# Patient Record
Sex: MALE | Race: WHITE | Employment: OTHER | URBAN - METROPOLITAN AREA
[De-identification: names, ages, dates, MRNs, and addresses within clinical notes are randomized per-mention and may not be internally consistent; named-entity substitution may affect disease eponyms.]

---

## 2024-04-23 NOTE — PROGRESS NOTES
Name_______________________________________Printed:____________________  Date and time of surgery__4/25/2024_______0730_______________Arrival Time:____0600____________   1. The instructions given regarding when and if a patient needs to stop oral intake prior to surgery varies.Follow the specific instructions you were given                  _x__Nothing to eat or to drink after Midnight the night before.                   ____Carbo loading or instructions will be given to select patients-if you have been given those instructions -please do the following                           The evening before your surgery after dinner before midnight drink 40 ounces of gatorade.If you are diabetic use sugar free.  The morning of surgery drink 40 ounces of water.This needs to be finished 3 hours prior to your surgery start time.    2. Take the following pills with a small sip of water on the morning of surgery___________________________________________________                  Do not take blood pressure medications ending in pril or sartan the reji prior to surgery or the morning of surgery. Dr York's patient are not to take any medications the AM of surgery.         3. Aspirin, Ibuprofen, Advil, Naproxen, Vitamin E and other Anti-inflammatory products and supplements should be stopped for 5 -7days before surgery or as directed by your physician.   4. Check with your Doctor regarding stopping Plavix, Coumadin,Eliquis, Lovenox,Effient,Pradaxa,Xarelto, Fragmin or other blood thinners and follow their instructions.   5. Do not smoke, and do not drink any alcoholic beverages 24 hours prior to surgery.  This includes NA Beer.Refrain from the usage of any recreational drugs.   6. You may brush your teeth and gargle the morning of surgery.  DO NOT SWALLOW WATER   7. You MUST make arrangements for a responsible adult to stay on site while you are here and take you home after your surgery. You will not be allowed to leave alone or drive

## 2024-04-24 ENCOUNTER — ANESTHESIA EVENT (OUTPATIENT)
Dept: OPERATING ROOM | Age: 74
End: 2024-04-24
Payer: MEDICARE

## 2024-04-25 ENCOUNTER — APPOINTMENT (OUTPATIENT)
Dept: GENERAL RADIOLOGY | Age: 74
End: 2024-04-25
Attending: UROLOGY
Payer: MEDICARE

## 2024-04-25 ENCOUNTER — HOSPITAL ENCOUNTER (OUTPATIENT)
Age: 74
Setting detail: OUTPATIENT SURGERY
Discharge: HOME | End: 2024-04-25
Attending: UROLOGY | Admitting: UROLOGY
Payer: MEDICARE

## 2024-04-25 ENCOUNTER — ANESTHESIA (OUTPATIENT)
Dept: OPERATING ROOM | Age: 74
End: 2024-04-25
Payer: MEDICARE

## 2024-04-25 VITALS
OXYGEN SATURATION: 96 % | RESPIRATION RATE: 14 BRPM | HEART RATE: 69 BPM | BODY MASS INDEX: 22.73 KG/M2 | WEIGHT: 163 LBS | DIASTOLIC BLOOD PRESSURE: 65 MMHG | SYSTOLIC BLOOD PRESSURE: 150 MMHG | TEMPERATURE: 97 F

## 2024-04-25 DIAGNOSIS — N21.0 CALCULUS IN BLADDER: Primary | ICD-10-CM

## 2024-04-25 PROCEDURE — 6370000000 HC RX 637 (ALT 250 FOR IP): Performed by: ANESTHESIOLOGY

## 2024-04-25 PROCEDURE — 7100000010 HC PHASE II RECOVERY - FIRST 15 MIN: Performed by: UROLOGY

## 2024-04-25 PROCEDURE — 3600000004 HC SURGERY LEVEL 4 BASE: Performed by: UROLOGY

## 2024-04-25 PROCEDURE — 6360000002 HC RX W HCPCS: Performed by: ANESTHESIOLOGY

## 2024-04-25 PROCEDURE — 7100000000 HC PACU RECOVERY - FIRST 15 MIN: Performed by: UROLOGY

## 2024-04-25 PROCEDURE — 3700000000 HC ANESTHESIA ATTENDED CARE: Performed by: UROLOGY

## 2024-04-25 PROCEDURE — 6360000002 HC RX W HCPCS: Performed by: UROLOGY

## 2024-04-25 PROCEDURE — 2720000010 HC SURG SUPPLY STERILE: Performed by: UROLOGY

## 2024-04-25 PROCEDURE — 3600000014 HC SURGERY LEVEL 4 ADDTL 15MIN: Performed by: UROLOGY

## 2024-04-25 PROCEDURE — 3700000001 HC ADD 15 MINUTES (ANESTHESIA): Performed by: UROLOGY

## 2024-04-25 PROCEDURE — 6360000002 HC RX W HCPCS

## 2024-04-25 PROCEDURE — 7100000011 HC PHASE II RECOVERY - ADDTL 15 MIN: Performed by: UROLOGY

## 2024-04-25 PROCEDURE — 2580000003 HC RX 258: Performed by: UROLOGY

## 2024-04-25 PROCEDURE — C1769 GUIDE WIRE: HCPCS | Performed by: UROLOGY

## 2024-04-25 PROCEDURE — 82365 CALCULUS SPECTROSCOPY: CPT

## 2024-04-25 PROCEDURE — 2709999900 HC NON-CHARGEABLE SUPPLY: Performed by: UROLOGY

## 2024-04-25 PROCEDURE — 2500000003 HC RX 250 WO HCPCS

## 2024-04-25 PROCEDURE — 7100000001 HC PACU RECOVERY - ADDTL 15 MIN: Performed by: UROLOGY

## 2024-04-25 RX ORDER — CIPROFLOXACIN 2 MG/ML
400 INJECTION, SOLUTION INTRAVENOUS
Status: DISCONTINUED | OUTPATIENT
Start: 2024-04-25 | End: 2024-04-25 | Stop reason: ALTCHOICE

## 2024-04-25 RX ORDER — HYDROCODONE BITARTRATE AND ACETAMINOPHEN 5; 325 MG/1; MG/1
1 TABLET ORAL EVERY 6 HOURS PRN
Qty: 20 TABLET | Refills: 0 | Status: SHIPPED | OUTPATIENT
Start: 2024-04-25 | End: 2024-04-30

## 2024-04-25 RX ORDER — DEXAMETHASONE SODIUM PHOSPHATE 4 MG/ML
INJECTION, SOLUTION INTRA-ARTICULAR; INTRALESIONAL; INTRAMUSCULAR; INTRAVENOUS; SOFT TISSUE PRN
Status: DISCONTINUED | OUTPATIENT
Start: 2024-04-25 | End: 2024-04-25 | Stop reason: SDUPTHER

## 2024-04-25 RX ORDER — SODIUM CHLORIDE 0.9 % (FLUSH) 0.9 %
5-40 SYRINGE (ML) INJECTION EVERY 12 HOURS SCHEDULED
Status: DISCONTINUED | OUTPATIENT
Start: 2024-04-25 | End: 2024-04-25 | Stop reason: HOSPADM

## 2024-04-25 RX ORDER — NALOXONE HYDROCHLORIDE 0.4 MG/ML
INJECTION, SOLUTION INTRAMUSCULAR; INTRAVENOUS; SUBCUTANEOUS PRN
Status: DISCONTINUED | OUTPATIENT
Start: 2024-04-25 | End: 2024-04-25 | Stop reason: HOSPADM

## 2024-04-25 RX ORDER — HYDROMORPHONE HYDROCHLORIDE 2 MG/ML
0.5 INJECTION, SOLUTION INTRAMUSCULAR; INTRAVENOUS; SUBCUTANEOUS EVERY 5 MIN PRN
Status: DISCONTINUED | OUTPATIENT
Start: 2024-04-25 | End: 2024-04-25 | Stop reason: HOSPADM

## 2024-04-25 RX ORDER — SODIUM CHLORIDE 0.9 % (FLUSH) 0.9 %
5-40 SYRINGE (ML) INJECTION PRN
Status: DISCONTINUED | OUTPATIENT
Start: 2024-04-25 | End: 2024-04-25 | Stop reason: HOSPADM

## 2024-04-25 RX ORDER — ROCURONIUM BROMIDE 10 MG/ML
INJECTION, SOLUTION INTRAVENOUS PRN
Status: DISCONTINUED | OUTPATIENT
Start: 2024-04-25 | End: 2024-04-25 | Stop reason: SDUPTHER

## 2024-04-25 RX ORDER — APREPITANT 40 MG/1
40 CAPSULE ORAL ONCE
Status: COMPLETED | OUTPATIENT
Start: 2024-04-25 | End: 2024-04-25

## 2024-04-25 RX ORDER — FENTANYL CITRATE 50 UG/ML
25 INJECTION, SOLUTION INTRAMUSCULAR; INTRAVENOUS EVERY 5 MIN PRN
Status: DISCONTINUED | OUTPATIENT
Start: 2024-04-25 | End: 2024-04-25 | Stop reason: HOSPADM

## 2024-04-25 RX ORDER — HYDRALAZINE HYDROCHLORIDE 20 MG/ML
10 INJECTION INTRAMUSCULAR; INTRAVENOUS
Status: DISCONTINUED | OUTPATIENT
Start: 2024-04-25 | End: 2024-04-25 | Stop reason: HOSPADM

## 2024-04-25 RX ORDER — ONDANSETRON 2 MG/ML
4 INJECTION INTRAMUSCULAR; INTRAVENOUS
Status: DISCONTINUED | OUTPATIENT
Start: 2024-04-25 | End: 2024-04-25 | Stop reason: HOSPADM

## 2024-04-25 RX ORDER — LIDOCAINE HYDROCHLORIDE 10 MG/ML
0.5 INJECTION, SOLUTION EPIDURAL; INFILTRATION; INTRACAUDAL; PERINEURAL ONCE
Status: DISCONTINUED | OUTPATIENT
Start: 2024-04-25 | End: 2024-04-25 | Stop reason: HOSPADM

## 2024-04-25 RX ORDER — DEXMEDETOMIDINE HYDROCHLORIDE 100 UG/ML
INJECTION, SOLUTION INTRAVENOUS PRN
Status: DISCONTINUED | OUTPATIENT
Start: 2024-04-25 | End: 2024-04-25 | Stop reason: SDUPTHER

## 2024-04-25 RX ORDER — LABETALOL HYDROCHLORIDE 5 MG/ML
10 INJECTION, SOLUTION INTRAVENOUS
Status: DISCONTINUED | OUTPATIENT
Start: 2024-04-25 | End: 2024-04-25 | Stop reason: HOSPADM

## 2024-04-25 RX ORDER — OXYCODONE HYDROCHLORIDE 5 MG/1
5 TABLET ORAL
Status: DISCONTINUED | OUTPATIENT
Start: 2024-04-25 | End: 2024-04-25 | Stop reason: HOSPADM

## 2024-04-25 RX ORDER — MIDAZOLAM HYDROCHLORIDE 2 MG/2ML
2 INJECTION, SOLUTION INTRAMUSCULAR; INTRAVENOUS
Status: DISCONTINUED | OUTPATIENT
Start: 2024-04-25 | End: 2024-04-25 | Stop reason: HOSPADM

## 2024-04-25 RX ORDER — FENTANYL CITRATE 50 UG/ML
INJECTION, SOLUTION INTRAMUSCULAR; INTRAVENOUS PRN
Status: DISCONTINUED | OUTPATIENT
Start: 2024-04-25 | End: 2024-04-25 | Stop reason: SDUPTHER

## 2024-04-25 RX ORDER — SODIUM CHLORIDE 9 MG/ML
INJECTION, SOLUTION INTRAVENOUS PRN
Status: DISCONTINUED | OUTPATIENT
Start: 2024-04-25 | End: 2024-04-25 | Stop reason: HOSPADM

## 2024-04-25 RX ORDER — SODIUM CHLORIDE, SODIUM LACTATE, POTASSIUM CHLORIDE, CALCIUM CHLORIDE 600; 310; 30; 20 MG/100ML; MG/100ML; MG/100ML; MG/100ML
INJECTION, SOLUTION INTRAVENOUS CONTINUOUS
Status: DISCONTINUED | OUTPATIENT
Start: 2024-04-25 | End: 2024-04-25 | Stop reason: HOSPADM

## 2024-04-25 RX ORDER — ACETAMINOPHEN 325 MG/1
650 TABLET ORAL ONCE
Status: COMPLETED | OUTPATIENT
Start: 2024-04-25 | End: 2024-04-25

## 2024-04-25 RX ORDER — DIPHENHYDRAMINE HYDROCHLORIDE 50 MG/ML
12.5 INJECTION INTRAMUSCULAR; INTRAVENOUS
Status: DISCONTINUED | OUTPATIENT
Start: 2024-04-25 | End: 2024-04-25 | Stop reason: HOSPADM

## 2024-04-25 RX ORDER — PROCHLORPERAZINE EDISYLATE 5 MG/ML
5 INJECTION INTRAMUSCULAR; INTRAVENOUS
Status: DISCONTINUED | OUTPATIENT
Start: 2024-04-25 | End: 2024-04-25 | Stop reason: HOSPADM

## 2024-04-25 RX ORDER — LIDOCAINE HYDROCHLORIDE 20 MG/ML
INJECTION, SOLUTION EPIDURAL; INFILTRATION; INTRACAUDAL; PERINEURAL PRN
Status: DISCONTINUED | OUTPATIENT
Start: 2024-04-25 | End: 2024-04-25 | Stop reason: SDUPTHER

## 2024-04-25 RX ORDER — ONDANSETRON 2 MG/ML
INJECTION INTRAMUSCULAR; INTRAVENOUS PRN
Status: DISCONTINUED | OUTPATIENT
Start: 2024-04-25 | End: 2024-04-25 | Stop reason: SDUPTHER

## 2024-04-25 RX ORDER — PHENAZOPYRIDINE HYDROCHLORIDE 100 MG/1
200 TABLET, FILM COATED ORAL 3 TIMES DAILY PRN
Qty: 9 TABLET | Refills: 0 | Status: SHIPPED | OUTPATIENT
Start: 2024-04-25 | End: 2024-04-28

## 2024-04-25 RX ORDER — LEVOFLOXACIN 5 MG/ML
500 INJECTION, SOLUTION INTRAVENOUS
Status: COMPLETED | OUTPATIENT
Start: 2024-04-25 | End: 2024-04-25

## 2024-04-25 RX ORDER — MEPERIDINE HYDROCHLORIDE 25 MG/ML
12.5 INJECTION INTRAMUSCULAR; INTRAVENOUS; SUBCUTANEOUS
Status: DISCONTINUED | OUTPATIENT
Start: 2024-04-25 | End: 2024-04-25 | Stop reason: HOSPADM

## 2024-04-25 RX ORDER — PROPOFOL 10 MG/ML
INJECTION, EMULSION INTRAVENOUS PRN
Status: DISCONTINUED | OUTPATIENT
Start: 2024-04-25 | End: 2024-04-25 | Stop reason: SDUPTHER

## 2024-04-25 RX ORDER — AMOXICILLIN 250 MG
1 CAPSULE ORAL 2 TIMES DAILY
Qty: 30 TABLET | Refills: 1 | Status: SHIPPED | OUTPATIENT
Start: 2024-04-25 | End: 2024-05-25

## 2024-04-25 RX ADMIN — ACETAMINOPHEN 650 MG: 325 TABLET ORAL at 07:07

## 2024-04-25 RX ADMIN — FENTANYL CITRATE 50 MCG: 50 INJECTION, SOLUTION INTRAMUSCULAR; INTRAVENOUS at 07:36

## 2024-04-25 RX ADMIN — SODIUM CHLORIDE, POTASSIUM CHLORIDE, SODIUM LACTATE AND CALCIUM CHLORIDE: 600; 310; 30; 20 INJECTION, SOLUTION INTRAVENOUS at 06:55

## 2024-04-25 RX ADMIN — FENTANYL CITRATE 25 MCG: 50 INJECTION, SOLUTION INTRAMUSCULAR; INTRAVENOUS at 08:01

## 2024-04-25 RX ADMIN — APREPITANT 40 MG: 40 CAPSULE ORAL at 07:07

## 2024-04-25 RX ADMIN — ROCURONIUM BROMIDE 50 MG: 10 INJECTION, SOLUTION INTRAVENOUS at 07:37

## 2024-04-25 RX ADMIN — LEVOFLOXACIN 500 MG: 5 INJECTION, SOLUTION INTRAVENOUS at 07:21

## 2024-04-25 RX ADMIN — LIDOCAINE HYDROCHLORIDE 100 MG: 20 INJECTION, SOLUTION EPIDURAL; INFILTRATION; INTRACAUDAL; PERINEURAL at 07:36

## 2024-04-25 RX ADMIN — DEXMEDETOMIDINE HYDROCHLORIDE 4 MCG: 100 INJECTION, SOLUTION INTRAVENOUS at 08:05

## 2024-04-25 RX ADMIN — ONDANSETRON 4 MG: 2 INJECTION INTRAMUSCULAR; INTRAVENOUS at 07:57

## 2024-04-25 RX ADMIN — FENTANYL CITRATE 25 MCG: 50 INJECTION, SOLUTION INTRAMUSCULAR; INTRAVENOUS at 08:03

## 2024-04-25 RX ADMIN — SUGAMMADEX 200 MG: 100 INJECTION, SOLUTION INTRAVENOUS at 07:55

## 2024-04-25 RX ADMIN — DEXAMETHASONE SODIUM PHOSPHATE 8 MG: 4 INJECTION, SOLUTION INTRAMUSCULAR; INTRAVENOUS at 07:47

## 2024-04-25 RX ADMIN — PROPOFOL 150 MG: 10 INJECTION, EMULSION INTRAVENOUS at 07:36

## 2024-04-25 ASSESSMENT — PAIN - FUNCTIONAL ASSESSMENT
PAIN_FUNCTIONAL_ASSESSMENT: NONE - DENIES PAIN
PAIN_FUNCTIONAL_ASSESSMENT: NONE - DENIES PAIN

## 2024-04-25 ASSESSMENT — PAIN SCALES - GENERAL: PAINLEVEL_OUTOF10: 0

## 2024-04-25 ASSESSMENT — LIFESTYLE VARIABLES: SMOKING_STATUS: 0

## 2024-04-25 NOTE — ANESTHESIA PRE PROCEDURE
hypertension, past MI, CAD, dysrhythmias,  CHF, orthopnea,  RAPP, murmur and weak pulses      Rhythm: regular  Rate: normal                    Neuro/Psych:      (-) seizures, TIA and CVA           GI/Hepatic/Renal:   (+) bowel prep     (-) GERD, PUD, liver disease, no renal disease (history of renal calculi) and no morbid obesity       Endo/Other:    (+) malignancy/cancer (prostate, radiation pending).    (-) diabetes mellitus, hypothyroidism, hyperthyroidism, blood dyscrasia, no electrolyte abnormalities               Abdominal:         (-) obese       Vascular:          Other Findings:         Anesthesia Plan      general     ASA 2             Anesthetic plan and risks discussed with patient.      Plan discussed with CRNA.          This pre-anesthesia assessment may be used as a history and physical.    DOS STAFF ADDENDUM:    Pt seen and examined, chart reviewed (including anesthesia, drug and allergy history).  No interval changes to history and physical examination.  Anesthetic plan, risks, benefits, alternatives, and personnel involved discussed with patient.  Patient verbalized an understanding and agrees to proceed.      SANTO GARZON MD  April 25, 2024  7:01 AM

## 2024-04-25 NOTE — DISCHARGE INSTRUCTIONS
Follow up with Dr. Barnes with any questions, concerns, results, and an appointment after your procedure       It is perfectly normal and expected to have pink urine and to feel the need to urinate often while having a catheter in place.  Keep the bag below the level of the bladder and do not let it get so full that urine backs up into the tubing.  The urine will drain from your bladder constantly as long as the bag is below the level of the bladder.  You cannot control the flow of urine.  You may shower with a kumar catheter.  Just towel dry the bag and tubing.  Use soap and water to clean the site of insertion into your body.      UROLOGY DISCHARGE INSTRUCTIONS    Follow your surgeons instructions.  Your urine may look pink or red. You may also feel like you have to urinate often.  Call your surgeon if your temperature is higher than 101 degrees, your urine is grossly bloody, or has large clots, or is not draining into the bag (the tubing is clogged).  Drink plenty of fluids unless your physician advises against it.  You will be discharged home with a catheter in your bladder; follow instructions on care.   Take medications as directed.Take pain medication with food. Do not drive or operate machinery while taking narcotics.  Call your surgeon for any problems or questions        SEDATION DISCHARGE INSTRUCTIONS    4/25/2024    Wear your seatbelt home.  You are under the influence of drugs. Do not drink alcohol, drive, operate machinery, or make any important decisions or sign any legal documents for 24 hours  A responsible adult needs to be with you for 24 hours.  You may experience lightheadedness, dizziness, nausea, heightened emotions and/or sleepiness following surgery.  Rest at home today- increase activity as tolerated.  Progress slowly to a regular diet and drink plenty of fluids unless your physician has instructed you otherwise.  If nausea becomes a problem, call your physician.  Coughing, sore throat, and

## 2024-04-25 NOTE — PROGRESS NOTES
Kumar securement device placed by nurse at this time. Discharge instructions reviewed with pt and friend. Pt stated he has had kumar 3x previously. Nurse reviewed all home kumar care. All questions answered. Friend picked scripts up at OP nurse provided education on new medications all questions answered. Pt up with friend getting dressed. Pt requested to use only leg bag while home.

## 2024-04-25 NOTE — PROGRESS NOTES
Pt on unit from pacu report received at bedside. VSS pt denies any pain stated he feels \"mild burning\" pt aware kumar in place leg bag attached at this time. Call light in reach family at bedside. Pt tolerating fluids well.

## 2024-04-25 NOTE — OP NOTE
Urology Operative Note  [facility]  [facility]    Patient: Rob Claire  YOB: 1950   MRN: [id number]   Date of Procedure: 4/25/2024    Surgeon: Anson Barnes MD, BRENDAN    Preoperative Diagnosis: Calculus in bladder [N21.0]    Postoperative Diagnosis: same    Procedure: Litholapaxy: crushing or fragmentation of calculus by any means in bladder and removal of fragments; simple or small (less than 2.5 cm) (CPT 28798)      Anesthesia: General / LMA    Findings:   Bladder stone(s) (Size = 0.5 cm total volume; Number = 2); prior TURP defect, wide open, with 2 separate stones floor of bladder adjacent to bladder neck, no significant signs of radiation cystitis nor other abnormalities seen on cystourethroscopy, no urethral stricture disease, no tumors or other stone seen beyond the 2 that were treated in their entirety with holmium laser lithotripsy    Infection Present At Time Of Surgery (PATOS): No infection present    Estimated Blood Loss (mL): minimal    Complications: none apparent    Specimens: stone for analysis    Implants: none    Drains: Bauer catheter (Size: 20 Fr; Type: Coudé; with 10 mL sterile water in balloon)                 Indications: This patient presents for the above named surgery for bladder tumor(s). History and physical examination and other relevant patient data were reviewed and are detailed in the preoperative history/consult/progress note. Informed consent was obtained and the risks, benefits, and details of the procedure were explained to the patient who elected to proceed.    Description of Procedure:  The patient was brought to the operating room and placed in the supine position on the operating room table. SCDs were placed on the lower extremities. Following induction of anesthesia the patient was positioned in a lithotomy position, all pressure points were padded, and the genitals were prepped and draped in the usual sterile fashion. A routine

## 2024-04-25 NOTE — ANESTHESIA POSTPROCEDURE EVALUATION
Department of Anesthesiology  Postprocedure Note    Patient: Rob Claire  MRN: [id number]  YOB: 1950  Date of evaluation: 4/25/2024    Procedure Summary       Date: 04/25/24 Room / Location: [address]    Anesthesia Start: 0728 Anesthesia Stop: 0814    Procedure: CYSTOSCOPY LITHOLAPAXY OF  BLADDER STONE WITH HOLMIUM LASER- [facility] # [id number] Diagnosis:       Calculus, bladder      (Calculus, bladder [N21.0])    Surgeons: Anson Barnes MD Responsible Provider: Johnny Newton MD    Anesthesia Type: general ASA Status: 2            Anesthesia Type: No value filed.    Miroslava Phase I: Miroslava Score: 10    Miroslava Phase II: Miroslava Score: 10    Anesthesia Post Evaluation    Patient location during evaluation: PACU  Patient participation: complete - patient participated  Level of consciousness: awake and alert  Airway patency: patent  Nausea & Vomiting: no nausea and no vomiting  Cardiovascular status: blood pressure returned to baseline  Respiratory status: acceptable  Hydration status: euvolemic  Multimodal analgesia pain management approach  Pain management: adequate    No notable events documented.

## 2024-04-25 NOTE — PROGRESS NOTES
Pt resting quietly in bed, denies pain. VSS, O2 sats 96% on room air. Bauer remains in place draining clear yellow urine. Pt seen by anesthesia, phase 1 criteria met.

## 2024-04-25 NOTE — H&P
Urology Preoperative History & Physical  [facility]     Patient: Rob Claire MRN: [id number]  Room/Bed: OR/NONE   YOB: 1950  Age/Sex: 73 y.o.male  Admission Date: 4/25/2024     Date of Service:  4/25/2024    ASSESSMENT/PLAN     Bladder stones here for cystolithalopaxy  RBA DWP    Plan:  To OR for above procedure    All patient questions were answered. He understands the plan as listed above.    HISTORY     Chief Complaint: As above    History of Present Illness: Rob Claire is a 73 y.o. male with above listed problems.  No changes in history/physical since last evaluation.  No change in symptoms.      Past Medical History:  He has a past medical history of Arthritis, Cancer (HCC), and Kidney stone.     Hospital Problem List:  Active Problems:    * No active hospital problems. *  Resolved Problems:    * No resolved hospital problems. *      Past Surgical History:  He has a past surgical history that includes Cystoscopy (2010); TURP (09/27/2005); Vasectomy (Bilateral); TURP; Colonoscopy (12/19/2017); Finger trigger release (Left, 3/29/2019); and Colonoscopy (N/A, 12/20/2022).     Social History:  He reports that he has never smoked. He has never used smokeless tobacco. He reports current alcohol use. He reports that he does not use drugs.     Family History:  family history includes Cancer (age of onset: 63) in his father; Colon Cancer in his father; Heart Disease in his mother; High Cholesterol in his brother; Thyroid Disease in his daughter.    Allergies:  No Known Allergies    Medications:  Scheduled Meds:   lidocaine PF  0.5 mL IntraDERmal Once    levofloxacin  500 mg IntraVENous On Call to OR     Continuous Infusions:   lactated ringers IV soln 50 mL/hr at 04/25/24 0655     PRN Meds:    Review of Systems:  Pertinent positives/negatives reviewed in HPI.  All other systems reviewed and negative, unless noted below.    Constitutional: Negative  Genitourinary: see HPI  HEENT:

## 2024-04-25 NOTE — PROGRESS NOTES
Pt arrived from OR to PACU, awakens to voice, denies pain. VSS, O2 sats 100% on 6 L simple mask. Bauer in place with leg bag, urine clear yellow. Will monitor.

## 2024-04-30 LAB
APPEARANCE STONE: NORMAL
COMPN STONE: NORMAL
SPECIMEN WT: 4 MG

## 2024-05-29 RX ORDER — PRAVASTATIN SODIUM 40 MG
TABLET ORAL
Qty: 90 TABLET | Refills: 1 | Status: SHIPPED | OUTPATIENT
Start: 2024-05-29

## 2024-06-03 ASSESSMENT — PATIENT HEALTH QUESTIONNAIRE - PHQ9
SUM OF ALL RESPONSES TO PHQ9 QUESTIONS 1 & 2: 0
1. LITTLE INTEREST OR PLEASURE IN DOING THINGS: NOT AT ALL
1. LITTLE INTEREST OR PLEASURE IN DOING THINGS: NOT AT ALL
SUM OF ALL RESPONSES TO PHQ QUESTIONS 1-9: 0
SUM OF ALL RESPONSES TO PHQ QUESTIONS 1-9: 0
2. FEELING DOWN, DEPRESSED OR HOPELESS: NOT AT ALL
SUM OF ALL RESPONSES TO PHQ9 QUESTIONS 1 & 2: 0
SUM OF ALL RESPONSES TO PHQ QUESTIONS 1-9: 0
SUM OF ALL RESPONSES TO PHQ QUESTIONS 1-9: 0
2. FEELING DOWN, DEPRESSED OR HOPELESS: NOT AT ALL

## 2024-06-05 NOTE — PATIENT INSTRUCTIONS

## 2024-06-06 ENCOUNTER — OFFICE VISIT (OUTPATIENT)
Dept: FAMILY MEDICINE CLINIC | Age: 74
End: 2024-06-06

## 2024-06-06 VITALS
HEIGHT: 71 IN | BODY MASS INDEX: 23.1 KG/M2 | SYSTOLIC BLOOD PRESSURE: 128 MMHG | OXYGEN SATURATION: 98 % | DIASTOLIC BLOOD PRESSURE: 80 MMHG | HEART RATE: 62 BPM | WEIGHT: 165 LBS

## 2024-06-06 DIAGNOSIS — R49.0 HOARSENESS: Primary | ICD-10-CM

## 2024-06-06 DIAGNOSIS — Z86.2 HISTORY OF ANEMIA: ICD-10-CM

## 2024-06-06 DIAGNOSIS — E78.00 HYPERCHOLESTEREMIA: ICD-10-CM

## 2024-06-06 DIAGNOSIS — Z85.46 HISTORY OF PROSTATE CANCER: ICD-10-CM

## 2024-06-06 DIAGNOSIS — R63.4 WEIGHT LOSS: ICD-10-CM

## 2024-06-06 DIAGNOSIS — E05.90 HYPERTHYROIDISM: ICD-10-CM

## 2024-06-06 LAB
ALBUMIN SERPL-MCNC: 4 G/DL (ref 3.4–5)
ALBUMIN/GLOB SERPL: 1.7 {RATIO} (ref 1.1–2.2)
ALP SERPL-CCNC: 133 U/L (ref 40–129)
ALT SERPL-CCNC: 35 U/L (ref 10–40)
ANION GAP SERPL CALCULATED.3IONS-SCNC: 6 MMOL/L (ref 3–16)
AST SERPL-CCNC: 25 U/L (ref 15–37)
BASOPHILS # BLD: 0 K/UL (ref 0–0.2)
BASOPHILS NFR BLD: 0.7 %
BILIRUB SERPL-MCNC: 0.8 MG/DL (ref 0–1)
BUN SERPL-MCNC: 16 MG/DL (ref 7–20)
CALCIUM SERPL-MCNC: 9.7 MG/DL (ref 8.3–10.6)
CHLORIDE SERPL-SCNC: 108 MMOL/L (ref 99–110)
CO2 SERPL-SCNC: 26 MMOL/L (ref 21–32)
CREAT SERPL-MCNC: 0.7 MG/DL (ref 0.8–1.3)
DEPRECATED RDW RBC AUTO: 13.4 % (ref 12.4–15.4)
EOSINOPHIL # BLD: 0.1 K/UL (ref 0–0.6)
EOSINOPHIL NFR BLD: 2.1 %
GFR SERPLBLD CREATININE-BSD FMLA CKD-EPI: >90 ML/MIN/{1.73_M2}
GLUCOSE SERPL-MCNC: 103 MG/DL (ref 70–99)
HCT VFR BLD AUTO: 35.5 % (ref 40.5–52.5)
HGB BLD-MCNC: 12.3 G/DL (ref 13.5–17.5)
LYMPHOCYTES # BLD: 2.1 K/UL (ref 1–5.1)
LYMPHOCYTES NFR BLD: 41.1 %
MCH RBC QN AUTO: 32.2 PG (ref 26–34)
MCHC RBC AUTO-ENTMCNC: 34.7 G/DL (ref 31–36)
MCV RBC AUTO: 92.9 FL (ref 80–100)
MONOCYTES # BLD: 0.6 K/UL (ref 0–1.3)
MONOCYTES NFR BLD: 12.2 %
NEUTROPHILS # BLD: 2.3 K/UL (ref 1.7–7.7)
NEUTROPHILS NFR BLD: 43.9 %
PLATELET # BLD AUTO: 140 K/UL (ref 135–450)
PMV BLD AUTO: 9 FL (ref 5–10.5)
POTASSIUM SERPL-SCNC: 4.1 MMOL/L (ref 3.5–5.1)
PROT SERPL-MCNC: 6.4 G/DL (ref 6.4–8.2)
RBC # BLD AUTO: 3.82 M/UL (ref 4.2–5.9)
SODIUM SERPL-SCNC: 140 MMOL/L (ref 136–145)
T4 FREE SERPL-MCNC: 3.3 NG/DL (ref 0.9–1.8)
TSH SERPL DL<=0.005 MIU/L-ACNC: <0.01 UIU/ML (ref 0.27–4.2)
WBC # BLD AUTO: 5.2 K/UL (ref 4–11)

## 2024-06-06 SDOH — ECONOMIC STABILITY: FOOD INSECURITY: WITHIN THE PAST 12 MONTHS, THE FOOD YOU BOUGHT JUST DIDN'T LAST AND YOU DIDN'T HAVE MONEY TO GET MORE.: NEVER TRUE

## 2024-06-06 SDOH — ECONOMIC STABILITY: FOOD INSECURITY: WITHIN THE PAST 12 MONTHS, YOU WORRIED THAT YOUR FOOD WOULD RUN OUT BEFORE YOU GOT MONEY TO BUY MORE.: NEVER TRUE

## 2024-06-06 SDOH — ECONOMIC STABILITY: INCOME INSECURITY: IN THE LAST 12 MONTHS, WAS THERE A TIME WHEN YOU WERE NOT ABLE TO PAY THE MORTGAGE OR RENT ON TIME?: NO

## 2024-06-06 SDOH — ECONOMIC STABILITY: TRANSPORTATION INSECURITY
IN THE PAST 12 MONTHS, HAS THE LACK OF TRANSPORTATION KEPT YOU FROM MEDICAL APPOINTMENTS OR FROM GETTING MEDICATIONS?: NO

## 2024-06-06 SDOH — ECONOMIC STABILITY: HOUSING INSECURITY
IN THE LAST 12 MONTHS, WAS THERE A TIME WHEN YOU DID NOT HAVE A STEADY PLACE TO SLEEP OR SLEPT IN A SHELTER (INCLUDING NOW)?: NO

## 2024-06-06 SDOH — ECONOMIC STABILITY: INCOME INSECURITY: HOW HARD IS IT FOR YOU TO PAY FOR THE VERY BASICS LIKE FOOD, HOUSING, MEDICAL CARE, AND HEATING?: NOT HARD AT ALL

## 2024-06-06 SDOH — ECONOMIC STABILITY: TRANSPORTATION INSECURITY
IN THE PAST 12 MONTHS, HAS LACK OF TRANSPORTATION KEPT YOU FROM MEETINGS, WORK, OR FROM GETTING THINGS NEEDED FOR DAILY LIVING?: NO

## 2024-06-06 ASSESSMENT — SOCIAL DETERMINANTS OF HEALTH (SDOH): HOW HARD IS IT FOR YOU TO PAY FOR THE VERY BASICS LIKE FOOD, HOUSING, MEDICAL CARE, AND HEATING?: NOT HARD AT ALL

## 2024-06-06 NOTE — PROGRESS NOTES
[facility] Medicine  Clinic Note    Date: 6/6/2024                                               Subjective:     Chief Complaint   Patient presents with    Hoarse     HOARSENESS AND WEIGHT LOSS SINCE JAN.      HPI    Prostate cancer - xrt early 2023 - s/p cyberknife - seems to be good  Psa 0.05 on last check - sees urology dr. Barnes - had bladder stones - some urgency to urinate  Lipid/ cmp okay 10/23  STARTED 1/24 - THEN NOTICED WEIGHT LOSS 4/24 - UNDER 170 FOR FIRST TIME IN YEARS - NOW DOWN  BUT STABILIZED AROUND THERE OVER LAST FEW WEEKS.  Usually 175# is normal weight - down about 12#  Appetite okay -no change in diet - activity about the same  Walks 3x/ week- yard work - stays active.  Hoarseness for \"awhile\" but getting worse lately - so bad hard to talk - feels need to clear throat which helps short-term  No sore throat/ masses/ lumps in throat  Occ dry food like bread may get stuck.  Some pnd/ allergies, chronic for long term  No salena sxs  No cough/ sob  No other changes/ symptoms in body  On pravastatin / supplement  No palpitations/ anxiety sxs  Bm's okay - more frequent since cyberknife 2/d from 1/d and a little looser  No dental/ chewing problems  Energy okay  Ros o/w negative  Mood okay      Wt Readings from Last 3 Encounters:   06/06/24 74.8 kg (165 lb)   04/23/24 73.9 kg (163 lb)   10/05/23 80.3 kg (177 lb)     BP Readings from Last 3 Encounters:   06/06/24 128/80   04/25/24 (!) 150/65   10/05/23 126/78     Pulse Readings from Last 3 Encounters:   06/06/24 62   04/25/24 69   10/05/23 68            Patient Active Problem List    Diagnosis Date Noted    Trigger ring finger of left hand 02/27/2019    Other symptoms involving cardiovascular system 05/05/2011    Chalazion 05/05/2011    Benign neoplasm of skin of other and unspecified parts of face 05/05/2011    Sprain and strain of unspecified site of shoulder and upper arm 05/05/2011    Olecranon bursitis 05/05/2011    Swelling, mass, or lump

## 2024-06-07 RX ORDER — METHIMAZOLE 5 MG/1
5 TABLET ORAL 3 TIMES DAILY
Qty: 90 TABLET | Refills: 1 | Status: SHIPPED | OUTPATIENT
Start: 2024-06-07 | End: 2024-07-07

## 2024-06-10 ENCOUNTER — NURSE ONLY (OUTPATIENT)
Dept: FAMILY MEDICINE CLINIC | Age: 74
End: 2024-06-10
Payer: MEDICARE

## 2024-06-10 DIAGNOSIS — E05.90 HYPERTHYROIDISM: ICD-10-CM

## 2024-06-10 PROCEDURE — 36415 COLL VENOUS BLD VENIPUNCTURE: CPT | Performed by: FAMILY MEDICINE

## 2024-06-11 LAB
T3FREE SERPL-MCNC: 8 PG/ML (ref 2.3–4.2)
T4 FREE SERPL-MCNC: 3 NG/DL (ref 0.9–1.8)
THYROPEROXIDASE AB SERPL IA-ACNC: 7 IU/ML
TSH SERPL DL<=0.005 MIU/L-ACNC: <0.01 UIU/ML (ref 0.27–4.2)

## 2024-06-12 LAB — TSI SER-ACNC: 2.64 IU/L

## 2024-07-02 ENCOUNTER — OFFICE VISIT (OUTPATIENT)
Dept: ENT CLINIC | Age: 74
End: 2024-07-02
Payer: MEDICARE

## 2024-07-02 VITALS
OXYGEN SATURATION: 97 % | HEART RATE: 69 BPM | SYSTOLIC BLOOD PRESSURE: 162 MMHG | HEIGHT: 71 IN | BODY MASS INDEX: 23.38 KG/M2 | WEIGHT: 167 LBS | TEMPERATURE: 97.7 F | DIASTOLIC BLOOD PRESSURE: 65 MMHG

## 2024-07-02 DIAGNOSIS — R09.82 PND (POST-NASAL DRIP): ICD-10-CM

## 2024-07-02 DIAGNOSIS — J39.2 LESION OF PHARYNX: ICD-10-CM

## 2024-07-02 DIAGNOSIS — R49.0 DYSPHONIA: Primary | ICD-10-CM

## 2024-07-02 DIAGNOSIS — R13.10 DYSPHAGIA, UNSPECIFIED TYPE: ICD-10-CM

## 2024-07-02 DIAGNOSIS — K21.9 LARYNGOPHARYNGEAL REFLUX (LPR): ICD-10-CM

## 2024-07-02 PROCEDURE — 1123F ACP DISCUSS/DSCN MKR DOCD: CPT | Performed by: STUDENT IN AN ORGANIZED HEALTH CARE EDUCATION/TRAINING PROGRAM

## 2024-07-02 PROCEDURE — 31575 DIAGNOSTIC LARYNGOSCOPY: CPT | Performed by: STUDENT IN AN ORGANIZED HEALTH CARE EDUCATION/TRAINING PROGRAM

## 2024-07-02 PROCEDURE — 99204 OFFICE O/P NEW MOD 45 MIN: CPT | Performed by: STUDENT IN AN ORGANIZED HEALTH CARE EDUCATION/TRAINING PROGRAM

## 2024-07-02 RX ORDER — FLUTICASONE PROPIONATE 50 MCG
2 SPRAY, SUSPENSION (ML) NASAL DAILY
Qty: 48 G | Refills: 1 | Status: SHIPPED | OUTPATIENT
Start: 2024-07-02

## 2024-07-02 RX ORDER — LORATADINE 10 MG/1
10 TABLET ORAL DAILY
Qty: 30 TABLET | Refills: 3 | Status: SHIPPED | OUTPATIENT
Start: 2024-07-02

## 2024-07-02 RX ORDER — PANTOPRAZOLE SODIUM 40 MG/1
40 TABLET, DELAYED RELEASE ORAL
Qty: 90 TABLET | Refills: 1 | Status: SHIPPED | OUTPATIENT
Start: 2024-07-02

## 2024-07-02 NOTE — PROGRESS NOTES
hemorrhage, no nasal masses appreciated   Nasopharynx: Clear, no masses or inflammation  Oropharynx: No exophytic or ulcerative lesions, mucosa appears within normal limits  Base of Tongue: Lingual tonsils within normal limits, vallecula without effacement  Epiglottis: Upright, in normal anatomical position  True Vocal Cord: Anatomically normal, no masses or inflammation, normal abduction and adduction upon inspiration and phonation  False Vocal Cord: normal appearing without masses  Hypopharynx Mucosa: Nonobstructive protrusion of the left posterior pharyngeal wall superior to the level of the glottis with normal overlying mucosa.  + Moderate to severe postcricoid edema.  Arytenoids: Normal mucosa, no dislocation appreciated     * Patient tolerated the procedure well with no complications   * Patient was instructed not to eat for 30 minutes following procedure.   * Patient was instructed that they may notice minor bleeding.    Assessment and Plan     1. Dysphonia  2. Dysphagia, unspecified type  3. Lesion of pharynx  -Flexible fiberoptic laryngoscopy demonstrating protrusion of left posterior pharyngeal wall with overlying normal mucosa, possibly cervical osteophyte.  No ulcerative changes.  Will further evaluate with CT scan neck soft tissue with contrast.  Follow-up after CT neck.  - CT SOFT TISSUE NECK W CONTRAST; Future    4. Laryngopharyngeal reflux (LPR)  -Possibly related to underlying hoarseness and difficulty swallowing.  Start Protonix 40 mg daily.  -Discussed conservative management lifestyle modification strategies for reflux treatment including:      -Avoidance of late night eating and lying down soon after eating.  Consider lying down and sleeping in a reclined position as to reduce the gravity effects of acid reflux.        -Avoidance of trigger foods that could worsen reflux including alcohol, excessively salty foods, spicy foods, acidic foods, chocolate, peppermint, fatty foods, coffee.  -

## 2024-07-17 ENCOUNTER — HOSPITAL ENCOUNTER (OUTPATIENT)
Dept: CT IMAGING | Age: 74
Discharge: HOME | End: 2024-07-17
Attending: STUDENT IN AN ORGANIZED HEALTH CARE EDUCATION/TRAINING PROGRAM
Payer: MEDICARE

## 2024-07-17 DIAGNOSIS — R13.10 DYSPHAGIA, UNSPECIFIED TYPE: ICD-10-CM

## 2024-07-17 DIAGNOSIS — J39.2 LESION OF PHARYNX: ICD-10-CM

## 2024-07-17 PROCEDURE — 6360000004 HC RX CONTRAST MEDICATION: Performed by: STUDENT IN AN ORGANIZED HEALTH CARE EDUCATION/TRAINING PROGRAM

## 2024-07-17 PROCEDURE — 70491 CT SOFT TISSUE NECK W/DYE: CPT

## 2024-07-17 RX ADMIN — IOPAMIDOL 75 ML: 755 INJECTION, SOLUTION INTRAVENOUS at 07:14

## 2024-08-01 ENCOUNTER — OFFICE VISIT (OUTPATIENT)
Dept: ENT CLINIC | Age: 74
End: 2024-08-01
Payer: MEDICARE

## 2024-08-01 VITALS
HEIGHT: 71 IN | SYSTOLIC BLOOD PRESSURE: 161 MMHG | DIASTOLIC BLOOD PRESSURE: 70 MMHG | WEIGHT: 173 LBS | OXYGEN SATURATION: 97 % | TEMPERATURE: 97.7 F | HEART RATE: 61 BPM | BODY MASS INDEX: 24.22 KG/M2

## 2024-08-01 DIAGNOSIS — R13.10 DYSPHAGIA, UNSPECIFIED TYPE: ICD-10-CM

## 2024-08-01 DIAGNOSIS — R49.0 DYSPHONIA: Primary | ICD-10-CM

## 2024-08-01 DIAGNOSIS — K21.9 LARYNGOPHARYNGEAL REFLUX (LPR): ICD-10-CM

## 2024-08-01 PROCEDURE — 99214 OFFICE O/P EST MOD 30 MIN: CPT | Performed by: STUDENT IN AN ORGANIZED HEALTH CARE EDUCATION/TRAINING PROGRAM

## 2024-08-01 PROCEDURE — 1123F ACP DISCUSS/DSCN MKR DOCD: CPT | Performed by: STUDENT IN AN ORGANIZED HEALTH CARE EDUCATION/TRAINING PROGRAM

## 2024-08-01 NOTE — PROGRESS NOTES
[facility]  DIVISION OF OTOLARYNGOLOGY- HEAD & NECK SURGERY  CLINIC FOLLOW-UP VISIT      Patient Name: Rob Claire  Medical Record Number:  [id number]  Primary Care Physician:  Bebo Peñaloza MD    ChiefComplaint     Chief Complaint   Patient presents with    Follow-up     F/u after CT       History of Present Illness     Rob Claire is an 74 y.o. male previously seen for dysphagia, dysphonia, pharyngeal lesion, LPR, postnasal drainage.  Last seen 7/2/2024    Interval History:   No hx of neck surgery. No radiating pains down extremities. No recent changes in hoarseness. Has been to Protonix as instructed.  He feels like Flonase is helping decrease his nasal secretions.    Past Medical History     Past Medical History:   Diagnosis Date    Arthritis     Cancer (HCC)     prostate  radiation begins in 1/23    Kidney stone        Past Surgical History     Past Surgical History:   Procedure Laterality Date    COLONOSCOPY  12/19/2017    Za[place], normal    COLONOSCOPY N/A 12/20/2022    COLONOSCOPY DIAGNOSTIC performed by Sincere Cerda MD at [facility]    CYSTOSCOPY  2010    CYSTOSCOPY N/A 4/25/2024    CYSTOSCOPY LITHOLAPAXY OF  BLADDER STONE WITH HOLMIUM LASER- [facility] # [id number] performed by Anson Barnes MD at [facility] OR    FINGER TRIGGER RELEASE Left 3/29/2019    LEFT RING FINGER TRIGGER FINGER RELEASE performed by Damien Morales MD at [facility] OR    TURP  09/27/2005    TURP      VASECTOMY Bilateral        Family History     Family History   Problem Relation Age of Onset    Heart Disease Mother     Cancer Father 63        COLON CA    Colon Cancer Father     High Cholesterol Brother     Thyroid Disease Daughter        Social History     Social History     Tobacco Use    Smoking status: Never    Smokeless tobacco: Never   Vaping Use    Vaping Use: Never used   Substance Use Topics    Alcohol use: Yes     Comment: little    Drug use: No        Allergies     No Known Allergies    Medications     Current

## 2024-08-22 ENCOUNTER — OFFICE VISIT (OUTPATIENT)
Dept: ENDOCRINOLOGY | Age: 74
End: 2024-08-22
Payer: MEDICARE

## 2024-08-22 VITALS
WEIGHT: 171 LBS | DIASTOLIC BLOOD PRESSURE: 73 MMHG | HEIGHT: 71 IN | SYSTOLIC BLOOD PRESSURE: 169 MMHG | HEART RATE: 65 BPM | BODY MASS INDEX: 23.94 KG/M2

## 2024-08-22 DIAGNOSIS — E05.90 HYPERTHYROIDISM: Primary | ICD-10-CM

## 2024-08-22 DIAGNOSIS — E05.00 GRAVES DISEASE: ICD-10-CM

## 2024-08-22 DIAGNOSIS — E05.90 HYPERTHYROIDISM: ICD-10-CM

## 2024-08-22 LAB
ALBUMIN SERPL-MCNC: 4.2 G/DL (ref 3.4–5)
ALBUMIN/GLOB SERPL: 1.8 {RATIO} (ref 1.1–2.2)
ALP SERPL-CCNC: 143 U/L (ref 40–129)
ALT SERPL-CCNC: 35 U/L (ref 10–40)
ANION GAP SERPL CALCULATED.3IONS-SCNC: 8 MMOL/L (ref 3–16)
AST SERPL-CCNC: 31 U/L (ref 15–37)
BILIRUB SERPL-MCNC: 0.7 MG/DL (ref 0–1)
BUN SERPL-MCNC: 18 MG/DL (ref 7–20)
CALCIUM SERPL-MCNC: 9.2 MG/DL (ref 8.3–10.6)
CHLORIDE SERPL-SCNC: 101 MMOL/L (ref 99–110)
CO2 SERPL-SCNC: 30 MMOL/L (ref 21–32)
CREAT SERPL-MCNC: 0.8 MG/DL (ref 0.8–1.3)
GFR SERPLBLD CREATININE-BSD FMLA CKD-EPI: >90 ML/MIN/{1.73_M2}
GLUCOSE SERPL-MCNC: 107 MG/DL (ref 70–99)
POTASSIUM SERPL-SCNC: 4.3 MMOL/L (ref 3.5–5.1)
PROT SERPL-MCNC: 6.6 G/DL (ref 6.4–8.2)
SODIUM SERPL-SCNC: 139 MMOL/L (ref 136–145)
T4 FREE SERPL-MCNC: 4.5 NG/DL (ref 0.9–1.8)
TSH SERPL DL<=0.005 MIU/L-ACNC: <0.01 UIU/ML (ref 0.27–4.2)

## 2024-08-22 PROCEDURE — 99204 OFFICE O/P NEW MOD 45 MIN: CPT | Performed by: INTERNAL MEDICINE

## 2024-08-22 PROCEDURE — G2211 COMPLEX E/M VISIT ADD ON: HCPCS | Performed by: INTERNAL MEDICINE

## 2024-08-22 PROCEDURE — 1123F ACP DISCUSS/DSCN MKR DOCD: CPT | Performed by: INTERNAL MEDICINE

## 2024-08-22 RX ORDER — METHIMAZOLE 5 MG/1
15 TABLET ORAL DAILY
Qty: 180 TABLET | Refills: 1 | Status: SHIPPED | OUTPATIENT
Start: 2024-08-22

## 2024-08-22 RX ORDER — METHIMAZOLE 5 MG/1
5 TABLET ORAL 3 TIMES DAILY
COMMUNITY
End: 2024-08-22 | Stop reason: SDUPTHER

## 2024-08-22 NOTE — PROGRESS NOTES
will also update labs and adjust dose if needed.  I discussed with patient mechanism of action of methimazole and that it's generally a safe medication.  I discussed with patient common side effects including skin rash and less common but more serious side effects including agranulocytosis and hepatotoxicity (<0.05%).  Discussed with patient to inform the clinic in case of right-sided abdominal pain, jaundice or fever development.  Patient reports understanding.  We discussed alternative therapies including radioactive iodine ablation and thyroidectomy.  For now, the use of antithyroid medication would be his best option.  Reassess TSH and free T4 every 2 months.    -     methIMAzole (TAPAZOLE) 5 MG tablet; Take 3 tablets by mouth daily, Disp-180 tablet, R-1Normal  -     TSH; Future  -     T4, Free; Future  -     Comprehensive Metabolic Panel; Future  -     T4, Free; Future  -     TSH; Future  -     T4, Free; Future  -     TSH; Future  2. Graves disease  3.  Dysphagia, likely related to anterior bridging osteophytes within the cervical spine.  Patient will continue to monitor at this time.  4.  Hoarseness of voice, unlikely to be related to goiter as exam did not reveal a significant elevation in thyroid.  Continue evaluation with sleep pathology.        Return in about 20 weeks (around 1/9/2025) for Hyperthyroidism.      Electronically signed by Marimar Anderson MD on 8/22/2024 at 12:39 PM    Thank you very much for allowing me to take care of this patient along with you.    Comment: This documentation utilized a software-based speech recognition technology (voice-to-text process), where occasional errors in transcription can occur.

## 2024-08-29 ENCOUNTER — PROCEDURE VISIT (OUTPATIENT)
Dept: SPEECH THERAPY | Age: 74
End: 2024-08-29
Payer: MEDICARE

## 2024-08-29 DIAGNOSIS — J38.3 GLOTTIC INSUFFICIENCY: ICD-10-CM

## 2024-08-29 DIAGNOSIS — R13.10 DYSPHAGIA, UNSPECIFIED TYPE: ICD-10-CM

## 2024-08-29 DIAGNOSIS — J38.3 VOCAL FOLD ATROPHY: ICD-10-CM

## 2024-08-29 DIAGNOSIS — R49.0 DYSPHONIA: Primary | ICD-10-CM

## 2024-08-29 PROCEDURE — 31579 LARYNGOSCOPY TELESCOPIC: CPT | Performed by: SPEECH-LANGUAGE PATHOLOGIST

## 2024-08-29 PROCEDURE — 92507 TX SP LANG VOICE COMM INDIV: CPT | Performed by: SPEECH-LANGUAGE PATHOLOGIST

## 2024-08-29 PROCEDURE — 92524 BEHAVRAL QUALIT ANALYS VOICE: CPT | Performed by: SPEECH-LANGUAGE PATHOLOGIST

## 2024-08-29 NOTE — PROGRESS NOTES
Laryngoscopy  Performed by: Merry Fallon SLP  Anesthesia: Afrin with 4% lidocaine  Description:  The scope was passed along the floor of the right naris to the level of the larynx. There was no evidence of concerning masses or lesions of the base of tongue, vallecula, epiglottis, aryepiglottic folds, arytenoids, false vocal folds, true vocal folds, or pyriform sinuses. The scope was removed. The patient tolerated the procedure without difficulty. There were no complications.  Pertinent findings: See Assessment and Plan of Care                   ASSESSMENT:   74 y.o. male w/ hx of dysphonia. VLS revealed L-sided supraglottic compression (osteophyte) resulting in impaired visualization of UES; questionable mildly restricted abduction of L arytenoid d/t location. Bilateral TVFs w/ mild atrophy; glottic closure characterized as spindle during all phonatory tasks. Mildly reduced superior-inferior wave but grossly functional periodicity. Mild-moderate supraglottic compression (LM>AP), more prominent during conversational tasks.     Mild interarytenoid pachydermia but YURIDIA posterior edema; thin, diffuse mucus was observed, indicative of laryngopharyngeal reflux. Subglottis was patent without evidence of narrowing. No mass lesions, paresis or paralysis was noted.     EDUCATION & THERAPY:   Reviewed VLS w/ pt and educated to presence of osteophyte + suspected contribution to dysphagia; may benefit from formal swallow evaluation (MBS) to fully assess physiology + safety, as may require neurosurgery referral should progressive changes occur. 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 Interested in pursuing tx at this time.     Introduced to SOVT strawflow for coordination of respiration/phonation, reduced laryngeal tension, and increased muscular conditioning; initial difficulties w/ consistent airflow but cue for \"who\"  40 min 8/29/2024 60 days        Thank you,    Merry Fallon MA (Katie), CCC-SLP; [id number]  Voice Specialized Speech-Language Pathologist

## 2024-08-29 NOTE — PATIENT INSTRUCTIONS
Vocal fold bowing   -incomplete closure of the vocal folds when talking and swallowing due to weakness    Treatment:  Voice therapy/exercises  Surgical options  Injection laryngoplasty       Bubbles  -Continuous bubbles (air)  -Gentle voicing (hum)  -“Buzz” on the lips  -No strain or effort in the throat       Straight         Up         Down         Sirens      *10 min daily, 3-5 min @ a time   -Before/after talking, social outings, etc

## 2024-10-07 SDOH — HEALTH STABILITY: PHYSICAL HEALTH: ON AVERAGE, HOW MANY DAYS PER WEEK DO YOU ENGAGE IN MODERATE TO STRENUOUS EXERCISE (LIKE A BRISK WALK)?: 3 DAYS

## 2024-10-07 SDOH — HEALTH STABILITY: PHYSICAL HEALTH: ON AVERAGE, HOW MANY MINUTES DO YOU ENGAGE IN EXERCISE AT THIS LEVEL?: 70 MIN

## 2024-10-07 ASSESSMENT — PATIENT HEALTH QUESTIONNAIRE - PHQ9
SUM OF ALL RESPONSES TO PHQ QUESTIONS 1-9: 0
SUM OF ALL RESPONSES TO PHQ9 QUESTIONS 1 & 2: 0
1. LITTLE INTEREST OR PLEASURE IN DOING THINGS: NOT AT ALL
SUM OF ALL RESPONSES TO PHQ QUESTIONS 1-9: 0
2. FEELING DOWN, DEPRESSED OR HOPELESS: NOT AT ALL

## 2024-10-07 ASSESSMENT — LIFESTYLE VARIABLES
HOW OFTEN DO YOU HAVE A DRINK CONTAINING ALCOHOL: MONTHLY OR LESS
HOW MANY STANDARD DRINKS CONTAINING ALCOHOL DO YOU HAVE ON A TYPICAL DAY: 1 OR 2
HOW MANY STANDARD DRINKS CONTAINING ALCOHOL DO YOU HAVE ON A TYPICAL DAY: 1
HOW OFTEN DO YOU HAVE SIX OR MORE DRINKS ON ONE OCCASION: 1
HOW OFTEN DO YOU HAVE A DRINK CONTAINING ALCOHOL: 2

## 2024-10-10 ENCOUNTER — OFFICE VISIT (OUTPATIENT)
Dept: FAMILY MEDICINE CLINIC | Age: 74
End: 2024-10-10

## 2024-10-10 ENCOUNTER — PROCEDURE VISIT (OUTPATIENT)
Dept: SPEECH THERAPY | Age: 74
End: 2024-10-10
Payer: MEDICARE

## 2024-10-10 VITALS
DIASTOLIC BLOOD PRESSURE: 68 MMHG | OXYGEN SATURATION: 97 % | BODY MASS INDEX: 25.06 KG/M2 | WEIGHT: 179 LBS | RESPIRATION RATE: 13 BRPM | SYSTOLIC BLOOD PRESSURE: 138 MMHG | HEART RATE: 71 BPM | HEIGHT: 71 IN | TEMPERATURE: 98.7 F

## 2024-10-10 DIAGNOSIS — J38.3 GLOTTIC INSUFFICIENCY: ICD-10-CM

## 2024-10-10 DIAGNOSIS — Z00.00 MEDICARE ANNUAL WELLNESS VISIT, SUBSEQUENT: Primary | ICD-10-CM

## 2024-10-10 DIAGNOSIS — Z71.89 ACP (ADVANCE CARE PLANNING): ICD-10-CM

## 2024-10-10 DIAGNOSIS — R49.0 MUSCLE TENSION DYSPHONIA: ICD-10-CM

## 2024-10-10 DIAGNOSIS — R49.0 DYSPHONIA: Primary | ICD-10-CM

## 2024-10-10 DIAGNOSIS — J38.3 VOCAL FOLD ATROPHY: ICD-10-CM

## 2024-10-10 PROCEDURE — 92507 TX SP LANG VOICE COMM INDIV: CPT | Performed by: SPEECH-LANGUAGE PATHOLOGIST

## 2024-10-10 RX ORDER — TAMSULOSIN HYDROCHLORIDE 0.4 MG/1
0.4 CAPSULE ORAL DAILY
COMMUNITY
Start: 2024-09-28

## 2024-10-10 SDOH — ECONOMIC STABILITY: INCOME INSECURITY: IN THE LAST 12 MONTHS, WAS THERE A TIME WHEN YOU WERE NOT ABLE TO PAY THE MORTGAGE OR RENT ON TIME?: NO

## 2024-10-10 SDOH — ECONOMIC STABILITY: FOOD INSECURITY: WITHIN THE PAST 12 MONTHS, THE FOOD YOU BOUGHT JUST DIDN'T LAST AND YOU DIDN'T HAVE MONEY TO GET MORE.: NEVER TRUE

## 2024-10-10 SDOH — ECONOMIC STABILITY: FOOD INSECURITY: WITHIN THE PAST 12 MONTHS, YOU WORRIED THAT YOUR FOOD WOULD RUN OUT BEFORE YOU GOT MONEY TO BUY MORE.: NEVER TRUE

## 2024-10-10 SDOH — ECONOMIC STABILITY: INCOME INSECURITY: HOW HARD IS IT FOR YOU TO PAY FOR THE VERY BASICS LIKE FOOD, HOUSING, MEDICAL CARE, AND HEATING?: NOT HARD AT ALL

## 2024-10-10 NOTE — PROGRESS NOTES
[facility]  Voice Therapy      Pt Seen for Therapy - Session # 2     Diagnosis/Indication:   Primary: Dysphonia  Secondary: TVF atrophy  Tertiary: MTD    Background History:   PMHx of dysphonia + dysphagia w/ initial onset ~4 months ago; unknown precipitating event. Dysphonia characterized as roughness/gravel; vocal quality typically best in AM but gradually worsens t/o day, specifically w/ increased vocal load. Denied pain/discomfort/fatigue. Unable to identify additional factors that may improve voice. Dysphagia characterized by sensation of bolus \"sticking\", specifically w/ medications or dry solids (ie bread); requires liquid wash to clear. Intermittent coughing w/ liquids, specifically when taking too large sip. 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 Denied dyspnea. Endorsed frequent throat clear d/t mucus; improved since starting Flonase and continues to use daily. Denied hx of GERD; taking Protonix 40 mg w/o symptomatic changes.     Previous SLP Evaluations:   VLS 8/29/24  VLS revealed L-sided supraglottic compression (osteophyte) resulting in impaired visualization of UES; questionable mildly restricted abduction of L arytenoid d/t location. Bilateral TVFs w/ mild atrophy; glottic closure characterized as spindle during all phonatory tasks. Mildly reduced superior-inferior wave but grossly functional periodicity. Mild-moderate supraglottic compression (LM>AP), more prominent during conversational tasks.     SUBJECTIVE:   Endorsed little-no perceptual improvement in dysphonia at this time; continued roughness, gradually worse by end of day. Endorsed living alone w/ low vocal load. Reported mild perceptual change in swallow function; endorsed difficulties w/ \"dry\" items but otherwise little-no concern.     OBJECTIVE:   Voice Therapy    Goal: Session 2 Session 3 Session 4   Pt

## 2024-10-10 NOTE — PROGRESS NOTES
Medicare Annual Wellness Visit    Rob Claire is here for Medicare AWV (Patient does not have concerns, he is following up with all of his specialists who treat his thyroid, blood in stool, and hoarseness. //Patient would like a flu vaccine today.)    Assessment & Plan   Medicare annual wellness visit, subsequent  ACP (advance care planning)  -     Ambulatory Referral to ACP Clinical Specialist    Recommendations for Preventive Services Due: see orders and patient instructions/AVS.  Recommended screening schedule for the next 5-10 years is provided to the patient in written form: see Patient Instructions/AVS.     Return in about 9 months (around 7/10/2025) for AMV.     Subjective   Patient's complete Health Risk Assessment and screening values have been reviewed and are found in Flowsheets. The following problems were reviewed today and where indicated follow up appointments were made and/or referrals ordered.    Positive Risk Factor Screenings with Interventions:                   Dentist Screen:  Have you seen the dentist within the past year?: (!) No    Intervention:  Patient comments: reported that last visit was within 6 months      Safety:  Do you have any tripping hazards - loose or unsecured carpets or rugs?: (!) Yes  Interventions:  No fall episodes; currently no safety concerns reported at this time.       Advance Care Planning   The patient has the following advanced directives on file:  Advance Directives       Power of  Living Will ACP-Advance Directive ACP-Power of     Not on File Not on File Not on File Not on File            The patient has appointed the following active healthcare agents:    Primary Decision Maker: Jennifer Rosales - José - [phone]    The Patient has the following current code status:  Full Code    Visit Documentation:  I discussed Advance Care Planning with Rob Claire today which included the importance of making their choices for care and treatment in the

## 2024-10-11 ENCOUNTER — CLINICAL DOCUMENTATION (OUTPATIENT)
Dept: SPIRITUAL SERVICES | Age: 74
End: 2024-10-11

## 2024-10-11 NOTE — ACP (ADVANCE CARE PLANNING)
Advance Care Planning   Ambulatory ACP Specialist Patient Outreach    Date:  10/11/2024    ACP Specialist:  Zora Mejia LPN    Outreach call to patient in follow-up to ACP Specialist referral from:Bebo Peñaloza MD    [x] PCP  [] Provider   [] Ambulatory Care Management [] Other     For:                  [x] Advance Directive Assistance              [] Complete Portable DNR order              [] Complete POST/POLST/MOST              [] Code Status Discussion             [] Discuss Goals of Care             [] Early ACP Decision-Making              [] Other (Specify)    Date Referral Received:10/10/24    Next Step:   [x] ACP scheduled conversation  [] Outreach again in one week               [x] Email / Mail ACP Info Sheets  [x] Email / Mail Advance Directive   [] Closing referral.  Routing closure to referring provider/staff and to ACP Specialist .    [] Closure letter mailed to patient with invitation to contact ACP Specialist if / when ready.   [] Other (Specify here):       [x] At this time, Healthcare Decision Maker Is:         [] Primary agent named in scanned advance directive.    [] Legal Next of Kin.     [x] Unable to determine legal decision maker at this time.    Outreaches:       [x] 1st -  Date:  10/11/24               Intervention:  [x] Spoke with Patient   [] Left Voice mail [x] Email / Mail    [] [employer]  [] Other (Specify) :     Outcomes:Writer contacted patient on home/mobile phone ([phone]) to discuss ACP.  Patient is agreeable to a conversation with ACP Specialist Joslyn Casillas on Wednesday, October 16, 2024 at 3:00 p.m.  A copy of [place] [facility] forms and ACP information sheets sent to patient's confirmed email address on file with ACP Specialist and Coordinator's contact information included.           Thank you for this referral.

## 2024-10-16 ENCOUNTER — CLINICAL DOCUMENTATION (OUTPATIENT)
Dept: SPIRITUAL SERVICES | Age: 74
End: 2024-10-16

## 2024-10-16 NOTE — ACP (ADVANCE CARE PLANNING)
condition, would you want attempts to be made to restart your heart (answer \"yes\" for attempt to resuscitate) or would you prefer a natural death (answer \"no\" for do not attempt to resuscitate)?\"  Pt remains FULL CODE; plans to provide copy of his living will for his medical record.       [x] Yes   [] No   Educated Patient / Decision Maker regarding differences between Advance Directives and portable DNR orders.    Length of ACP Conversation in minutes:  10/15    Conversation Outcomes:  ACP discussion completed and Existing advance directive reviewed with patient; no changes to patient's previously recorded wishes    Follow-up plan:    [] Schedule follow-up conversation to continue planning  [] Referred individual to Provider for additional questions/concerns   [x] Advised patient/agent/surrogate to review completed ACP document and update if needed with changes in condition, patient preferences or care setting    [x] This note routed to one or more involved healthcare providers    Summary:  Very brief conversation with pt today. Pt shared he has his living will and durable healthcare POA documents completed. Pt does not wish to make any changes and has no questions regarding ACP at this time. Pt will send this SW a copy of his documents so they may be uploaded into his medical record. Will await receipt of documents.    10/18/2024 ADDENDUM:  Received ACP documents from pt today and they were reviewed. Pt named healthcare agents within [facility] and it was uploaded into medical record. Healthcare agents as outlined above (friend Jennifer and daughter Leonor). Pt has also completed a living will and copy placed in medical record. No other needs at this time. Referral can be closed.     {

## 2024-10-22 DIAGNOSIS — E05.90 HYPERTHYROIDISM: ICD-10-CM

## 2024-10-22 LAB
T4 FREE SERPL-MCNC: 0.9 NG/DL (ref 0.9–1.8)
TSH SERPL DL<=0.005 MIU/L-ACNC: <0.01 UIU/ML (ref 0.27–4.2)

## 2024-10-23 ENCOUNTER — TELEPHONE (OUTPATIENT)
Dept: ENDOCRINOLOGY | Age: 74
End: 2024-10-23

## 2024-10-23 DIAGNOSIS — E05.90 HYPERTHYROIDISM: ICD-10-CM

## 2024-10-23 RX ORDER — METHIMAZOLE 5 MG/1
10 TABLET ORAL DAILY
Qty: 180 TABLET | Refills: 1
Start: 2024-10-23

## 2024-10-23 NOTE — TELEPHONE ENCOUNTER
----- Message from Dr. Marimar Anderson MD sent at 10/23/2024  8:52 AM EDT -----  Please advise patient that his thyroid labs are showing improvement.  Would recommend to cut down methimazole to 10 mg daily.  Repeat labs beginning of December and again before next visit in January.

## 2024-12-05 ENCOUNTER — PROCEDURE VISIT (OUTPATIENT)
Dept: SPEECH THERAPY | Age: 74
End: 2024-12-05
Payer: MEDICARE

## 2024-12-05 DIAGNOSIS — J38.3 GLOTTIC INSUFFICIENCY: ICD-10-CM

## 2024-12-05 DIAGNOSIS — J38.3 VOCAL FOLD ATROPHY: ICD-10-CM

## 2024-12-05 DIAGNOSIS — R49.0 DYSPHONIA: Primary | ICD-10-CM

## 2024-12-05 PROCEDURE — 92507 TX SP LANG VOICE COMM INDIV: CPT | Performed by: SPEECH-LANGUAGE PATHOLOGIST

## 2024-12-05 NOTE — PROGRESS NOTES
[facility] ENT  Voice Therapy      Pt Seen for Therapy - Session # 3     Diagnosis/Indication:   Primary: Dysphonia  Secondary: TVF atrophy  Tertiary: MTD    Background History:   PMHx of dysphonia + dysphagia w/ initial onset ~4 months ago; unknown precipitating event. Dysphonia characterized as roughness/gravel; vocal quality typically best in AM but gradually worsens t/o day, specifically w/ increased vocal load. Denied pain/discomfort/fatigue. Unable to identify additional factors that may improve voice. Dysphagia characterized by sensation of bolus \"sticking\", specifically w/ medications or dry solids (ie bread); requires liquid wash to clear. Intermittent coughing w/ liquids, specifically when taking too large sip. 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 Denied dyspnea. Endorsed frequent throat clear d/t mucus; improved since starting Flonase and continues to use daily. Denied hx of GERD; taking Protonix 40 mg w/o symptomatic changes.     Previous SLP Evaluations:   VLS 8/29/24  VLS revealed L-sided supraglottic compression (osteophyte) resulting in impaired visualization of UES; questionable mildly restricted abduction of L arytenoid d/t location. Bilateral TVFs w/ mild atrophy; glottic closure characterized as spindle during all phonatory tasks. Mildly reduced superior-inferior wave but grossly functional periodicity. Mild-moderate supraglottic compression (LM>AP), more prominent during conversational tasks.     SUBJECTIVE:   Returns today after ~2 month d/t illness; endorsed consistent implementation of exercise/recommendations but little-no change in vocal quality. Continues to note ebbs/flows however, no moments of \"good\" voicing. Interested in discussing additional options.     OBJECTIVE:   Voice Therapy    Goal: Session 2 Session 3 Session 4   Pt will adhere to vocal hygiene

## 2024-12-06 ENCOUNTER — TELEPHONE (OUTPATIENT)
Dept: ENT CLINIC | Age: 74
End: 2024-12-06

## 2024-12-06 DIAGNOSIS — R49.0 DYSPHONIA: ICD-10-CM

## 2024-12-06 DIAGNOSIS — J38.3 GLOTTIC INSUFFICIENCY: Primary | ICD-10-CM

## 2024-12-06 NOTE — TELEPHONE ENCOUNTER
----- Message from Merry AUGUSTIN sent at 12/5/2024 12:01 PM EST -----  Hello!    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    Thank you!

## 2024-12-12 NOTE — TELEPHONE ENCOUNTER
If patient would like to consider bilateral vocal cord injections for his glottic insufficiency and hoarseness would recommend that he see Dr. Elza Chanel, laryngologist at [facility].  I do not do bilateral vocal cord injections.  I have placed a referral.  Please call the patient to let him know.

## 2024-12-13 NOTE — TELEPHONE ENCOUNTER
Left vm to call back to schedule an appt on #[phone].         Lmovm with information for Dr Elza Chanel at  for vocal injections, faxed referral with OV notes.

## 2024-12-16 ENCOUNTER — PATIENT MESSAGE (OUTPATIENT)
Dept: FAMILY MEDICINE CLINIC | Age: 74
End: 2024-12-16

## 2024-12-16 DIAGNOSIS — E78.00 HYPERCHOLESTEREMIA: Primary | ICD-10-CM

## 2024-12-16 RX ORDER — PRAVASTATIN SODIUM 40 MG
TABLET ORAL
Qty: 90 TABLET | Refills: 1 | OUTPATIENT
Start: 2024-12-16

## 2024-12-16 NOTE — TELEPHONE ENCOUNTER
Patient agreeable to coming in tomorrow at 8:30 for fasting lipid panel.     Order pended, can you sign?

## 2024-12-17 ENCOUNTER — LAB (OUTPATIENT)
Dept: FAMILY MEDICINE CLINIC | Age: 74
End: 2024-12-17
Payer: MEDICARE

## 2024-12-17 DIAGNOSIS — E78.00 HYPERCHOLESTEREMIA: ICD-10-CM

## 2024-12-17 LAB
CHOLEST SERPL-MCNC: 154 MG/DL (ref 0–199)
HDLC SERPL-MCNC: 49 MG/DL (ref 40–60)
LDLC SERPL CALC-MCNC: 86 MG/DL
TRIGL SERPL-MCNC: 93 MG/DL (ref 0–150)
VLDLC SERPL CALC-MCNC: 19 MG/DL

## 2024-12-17 PROCEDURE — 36415 COLL VENOUS BLD VENIPUNCTURE: CPT | Performed by: NURSE PRACTITIONER

## 2024-12-17 RX ORDER — PRAVASTATIN SODIUM 40 MG
TABLET ORAL
Qty: 90 TABLET | Refills: 3 | Status: SHIPPED | OUTPATIENT
Start: 2024-12-17

## 2024-12-20 DIAGNOSIS — E05.90 HYPERTHYROIDISM: ICD-10-CM

## 2024-12-20 LAB
REASON FOR REJECTION: NORMAL
REJECTED TEST: NORMAL

## 2024-12-23 ENCOUNTER — TELEPHONE (OUTPATIENT)
Dept: ENDOCRINOLOGY | Age: 74
End: 2024-12-23

## 2024-12-23 NOTE — TELEPHONE ENCOUNTER
Call from [facility] lab stating the patient has a rejected lab     Rejected Lab: Free T4, TSH   Unspun     Will call pt to redraw

## 2024-12-24 LAB
T4 FREE SERPL-MCNC: 1.3 NG/DL (ref 0.9–1.8)
TSH SERPL DL<=0.005 MIU/L-ACNC: 0.02 UIU/ML (ref 0.27–4.2)

## 2025-01-09 ENCOUNTER — OFFICE VISIT (OUTPATIENT)
Dept: ENDOCRINOLOGY | Age: 75
End: 2025-01-09
Payer: MEDICARE

## 2025-01-09 VITALS
BODY MASS INDEX: 25.48 KG/M2 | HEIGHT: 71 IN | HEART RATE: 67 BPM | SYSTOLIC BLOOD PRESSURE: 160 MMHG | WEIGHT: 182 LBS | DIASTOLIC BLOOD PRESSURE: 69 MMHG

## 2025-01-09 DIAGNOSIS — E05.90 HYPERTHYROIDISM: ICD-10-CM

## 2025-01-09 DIAGNOSIS — E05.00 GRAVES DISEASE: Primary | ICD-10-CM

## 2025-01-09 PROCEDURE — 1159F MED LIST DOCD IN RCRD: CPT | Performed by: INTERNAL MEDICINE

## 2025-01-09 PROCEDURE — G2211 COMPLEX E/M VISIT ADD ON: HCPCS | Performed by: INTERNAL MEDICINE

## 2025-01-09 PROCEDURE — 1123F ACP DISCUSS/DSCN MKR DOCD: CPT | Performed by: INTERNAL MEDICINE

## 2025-01-09 PROCEDURE — 99214 OFFICE O/P EST MOD 30 MIN: CPT | Performed by: INTERNAL MEDICINE

## 2025-01-09 RX ORDER — METHIMAZOLE 5 MG/1
15 TABLET ORAL DAILY
Qty: 270 TABLET | Refills: 1 | Status: SHIPPED | OUTPATIENT
Start: 2025-01-09

## 2025-01-09 RX ORDER — METHIMAZOLE 5 MG/1
15 TABLET ORAL DAILY
Qty: 180 TABLET | Refills: 1 | Status: SHIPPED | OUTPATIENT
Start: 2025-01-09 | End: 2025-01-09

## 2025-01-09 NOTE — PROGRESS NOTES
daily      multivitamin (THERAGRAN) per tablet Take 1 tablet by mouth daily Instructed to stop for surgery.      Glucosamine-Chondroitin 500-400 MG CAPS Take  by mouth 2 times daily. Instructed to stop for surgery.      fish oil-omega-3 fatty acids 1000 MG capsule Take 2 capsules by mouth daily Instructed to stop for surgery.          PAST MEDICAL HISTORY:  Past Medical History:   Diagnosis Date    Arthritis     Cancer (HCC)     prostate  radiation begins in 1/23    Kidney stone         PAST SURGICAL HISTORY:  Past Surgical History:   Procedure Laterality Date    COLONOSCOPY  12/19/2017    Zaida, normal    COLONOSCOPY N/A 12/20/2022    COLONOSCOPY DIAGNOSTIC performed by Sincere Creda MD at [facility] ENDOSCOPY    CYSTOSCOPY  2010    CYSTOSCOPY N/A 4/25/2024    CYSTOSCOPY LITHOLAPAXY OF  BLADDER STONE WITH HOLMIUM LASER- FORTEC # [id number] performed by Anson Barnes MD at [facility] OR    FINGER TRIGGER RELEASE Left 3/29/2019    LEFT RING FINGER TRIGGER FINGER RELEASE performed by Damien Morales MD at [facility] OR    TURP  09/27/2005    TURP      VASECTOMY Bilateral         FAMILY HISTORY:  Family History   Problem Relation Age of Onset    Heart Disease Mother     Cancer Father 63        COLON CA    Colon Cancer Father     High Cholesterol Brother     Thyroid Disease Daughter         SOCIAL HISTORY:  Social History     Tobacco Use    Smoking status: Never    Smokeless tobacco: Never   Substance Use Topics    Alcohol use: Yes     Comment: little        PHYSICAL EXAM:  BP (!) 160/69   Pulse 67   Ht 1.803 m (5' 11\")   Wt 82.6 kg (182 lb)   BMI 25.38 kg/m²   Wt Readings from Last 3 Encounters:   01/09/25 82.6 kg (182 lb)   10/10/24 81.2 kg (179 lb)   08/22/24 77.6 kg (171 lb)     Alert and oriented x3  Extraocular motions intact, no exophthalmos, no lid lag   Extremities + tremor to outstretched hand, no edema      LAB:  TSH   Date Value Ref Range Status   12/24/2024 0.02 (L) 0.27 - 4.20 uIU/mL Final   10/22/2024

## 2025-01-17 ENCOUNTER — TELEPHONE (OUTPATIENT)
Dept: ENDOCRINOLOGY | Age: 75
End: 2025-01-17

## 2025-01-17 DIAGNOSIS — E05.90 HYPERTHYROIDISM: ICD-10-CM

## 2025-01-17 RX ORDER — METHIMAZOLE 5 MG/1
15 TABLET ORAL DAILY
Qty: 270 TABLET | Refills: 1 | Status: SHIPPED | OUTPATIENT
Start: 2025-01-17

## 2025-01-17 NOTE — TELEPHONE ENCOUNTER
Pt asking for medication below sent to different pharmacy listed below       methIMAzole (TAPAZOLE) 5 MG tablet          [facility]   [address], [place] [address] [phone]

## 2025-02-07 ENCOUNTER — TELEPHONE (OUTPATIENT)
Dept: ENT CLINIC | Age: 75
End: 2025-02-07

## 2025-02-07 NOTE — TELEPHONE ENCOUNTER
----- Message from Merry AUGUSTIN sent at 2/6/2025  1:48 PM EST -----  Regarding: Voice Therapy  Hi Ladies!    Could you help get this patient scheduled to follow-up with me in the next few weeks for voice therapy?     Thank you!

## 2025-02-11 ENCOUNTER — TRANSCRIBE ORDERS (OUTPATIENT)
Dept: ADMINISTRATIVE | Age: 75
End: 2025-02-11

## 2025-02-11 DIAGNOSIS — R35.0 FREQUENCY OF MICTURITION: Primary | ICD-10-CM

## 2025-02-11 DIAGNOSIS — R31.29 OTHER MICROSCOPIC HEMATURIA: ICD-10-CM

## 2025-02-11 DIAGNOSIS — R39.9 UNSPECIFIED SYMPTOMS AND SIGNS INVOLVING THE GENITOURINARY SYSTEM: ICD-10-CM

## 2025-02-11 DIAGNOSIS — R31.0 GROSS HEMATURIA: ICD-10-CM

## 2025-02-11 DIAGNOSIS — R97.20 ELEVATED PROSTATE SPECIFIC ANTIGEN (PSA): ICD-10-CM

## 2025-02-11 DIAGNOSIS — R39.15 URGENCY OF URINATION: ICD-10-CM

## 2025-02-11 DIAGNOSIS — R33.9 RETENTION OF URINE, UNSPECIFIED: ICD-10-CM

## 2025-02-11 DIAGNOSIS — N39.0 URINARY TRACT INFECTION WITH HEMATURIA, SITE UNSPECIFIED: ICD-10-CM

## 2025-02-11 DIAGNOSIS — Z85.46 PERSONAL HISTORY OF MALIGNANT NEOPLASM OF PROSTATE: ICD-10-CM

## 2025-02-11 DIAGNOSIS — R31.9 URINARY TRACT INFECTION WITH HEMATURIA, SITE UNSPECIFIED: ICD-10-CM

## 2025-02-11 DIAGNOSIS — N40.0 ENLARGED PROSTATE WITHOUT LOWER URINARY TRACT SYMPTOMS (LUTS): ICD-10-CM

## 2025-02-11 DIAGNOSIS — C61 MALIGNANT NEOPLASM OF PROSTATE (HCC): ICD-10-CM

## 2025-02-11 DIAGNOSIS — M81.8 OTHER OSTEOPOROSIS WITHOUT CURRENT PATHOLOGICAL FRACTURE: ICD-10-CM

## 2025-02-11 DIAGNOSIS — C67.9 MALIGNANT NEOPLASM OF URINARY BLADDER, UNSPECIFIED SITE (HCC): ICD-10-CM

## 2025-02-11 DIAGNOSIS — N21.0 CALCULUS IN BLADDER: ICD-10-CM

## 2025-02-13 ENCOUNTER — HOSPITAL ENCOUNTER (OUTPATIENT)
Dept: ULTRASOUND IMAGING | Age: 75
Discharge: HOME | End: 2025-02-13
Payer: MEDICARE

## 2025-02-13 DIAGNOSIS — R39.15 URGENCY OF URINATION: ICD-10-CM

## 2025-02-13 DIAGNOSIS — R97.20 ELEVATED PROSTATE SPECIFIC ANTIGEN (PSA): ICD-10-CM

## 2025-02-13 DIAGNOSIS — N39.0 URINARY TRACT INFECTION WITH HEMATURIA, SITE UNSPECIFIED: ICD-10-CM

## 2025-02-13 DIAGNOSIS — C67.9 MALIGNANT NEOPLASM OF URINARY BLADDER, UNSPECIFIED SITE (HCC): ICD-10-CM

## 2025-02-13 DIAGNOSIS — R33.9 RETENTION OF URINE, UNSPECIFIED: ICD-10-CM

## 2025-02-13 DIAGNOSIS — N40.0 ENLARGED PROSTATE WITHOUT LOWER URINARY TRACT SYMPTOMS (LUTS): ICD-10-CM

## 2025-02-13 DIAGNOSIS — R31.9 URINARY TRACT INFECTION WITH HEMATURIA, SITE UNSPECIFIED: ICD-10-CM

## 2025-02-13 DIAGNOSIS — R31.0 GROSS HEMATURIA: ICD-10-CM

## 2025-02-13 DIAGNOSIS — M81.8 OTHER OSTEOPOROSIS WITHOUT CURRENT PATHOLOGICAL FRACTURE: ICD-10-CM

## 2025-02-13 DIAGNOSIS — Z85.46 PERSONAL HISTORY OF MALIGNANT NEOPLASM OF PROSTATE: ICD-10-CM

## 2025-02-13 DIAGNOSIS — N21.0 CALCULUS IN BLADDER: ICD-10-CM

## 2025-02-13 DIAGNOSIS — R39.9 UNSPECIFIED SYMPTOMS AND SIGNS INVOLVING THE GENITOURINARY SYSTEM: ICD-10-CM

## 2025-02-13 DIAGNOSIS — R35.0 FREQUENCY OF MICTURITION: ICD-10-CM

## 2025-02-13 DIAGNOSIS — C61 MALIGNANT NEOPLASM OF PROSTATE (HCC): ICD-10-CM

## 2025-02-13 DIAGNOSIS — R31.29 OTHER MICROSCOPIC HEMATURIA: ICD-10-CM

## 2025-02-13 PROCEDURE — 76770 US EXAM ABDO BACK WALL COMP: CPT

## 2025-02-27 ENCOUNTER — PROCEDURE VISIT (OUTPATIENT)
Dept: SPEECH THERAPY | Age: 75
End: 2025-02-27
Payer: MEDICARE

## 2025-02-27 DIAGNOSIS — J38.3 VOCAL FOLD ATROPHY: ICD-10-CM

## 2025-02-27 DIAGNOSIS — R49.0 MUSCLE TENSION DYSPHONIA: ICD-10-CM

## 2025-02-27 DIAGNOSIS — R49.0 DYSPHONIA: Primary | ICD-10-CM

## 2025-02-27 PROCEDURE — 92507 TX SP LANG VOICE COMM INDIV: CPT | Performed by: SPEECH-LANGUAGE PATHOLOGIST

## 2025-02-27 NOTE — PROGRESS NOTES
[facility] ENT  Voice Therapy      Pt Seen for Therapy - Session # 1 (2025)     Diagnosis/Indication:   Primary: Dysphonia  Secondary: TVF atrophy  Tertiary: MTD    Background History:   PMHx of dysphonia + dysphagia w/ initial onset ~4 months ago; unknown precipitating event. Dysphonia characterized as roughness/gravel; vocal quality typically best in AM but gradually worsens t/o day, specifically w/ increased vocal load. Denied pain/discomfort/fatigue. Unable to identify additional factors that may improve voice. Dysphagia characterized by sensation of bolus \"sticking\", specifically w/ medications or dry solids (ie bread); requires liquid wash to clear. Intermittent coughing w/ liquids, specifically when taking too large sip. 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 Denied dyspnea. Endorsed frequent throat clear d/t mucus; improved since starting Flonase and continues to use daily. Denied hx of GERD; taking Protonix 40 mg w/o symptomatic changes.     Previous SLP Evaluations:   VLS 8/29/24  VLS revealed L-sided supraglottic compression (osteophyte) resulting in impaired visualization of UES; questionable mildly restricted abduction of L arytenoid d/t location. Bilateral TVFs w/ mild atrophy; glottic closure characterized as spindle during all phonatory tasks. Mildly reduced superior-inferior wave but grossly functional periodicity. Mild-moderate supraglottic compression (LM>AP), more prominent during conversational tasks.     SUBJECTIVE:   Returns today for f/u post-bilateral IL, performed 2/6/25 w/ Dr. Elza Chanel at [facility]. Endorsed overall doing well; notes improvement in \"scratchiness\" of voice but denied additional changes.     OBJECTIVE:   Voice Therapy    Goal: Session 2 Session 3 Session 1 (2025)   Pt will adhere to vocal hygiene protocol during daily life activities w/ 80%

## 2025-02-28 ENCOUNTER — HOSPITAL ENCOUNTER (OUTPATIENT)
Age: 75
Discharge: HOME | End: 2025-02-28
Payer: MEDICARE

## 2025-02-28 ENCOUNTER — OFFICE VISIT (OUTPATIENT)
Dept: FAMILY MEDICINE CLINIC | Age: 75
End: 2025-02-28

## 2025-02-28 ENCOUNTER — HOSPITAL ENCOUNTER (OUTPATIENT)
Dept: GENERAL RADIOLOGY | Age: 75
Discharge: HOME | End: 2025-02-28
Payer: MEDICARE

## 2025-02-28 VITALS
DIASTOLIC BLOOD PRESSURE: 84 MMHG | SYSTOLIC BLOOD PRESSURE: 130 MMHG | HEIGHT: 71 IN | WEIGHT: 181 LBS | BODY MASS INDEX: 25.34 KG/M2 | HEART RATE: 70 BPM | OXYGEN SATURATION: 98 %

## 2025-02-28 DIAGNOSIS — M54.50 ACUTE LEFT-SIDED LOW BACK PAIN WITHOUT SCIATICA: ICD-10-CM

## 2025-02-28 DIAGNOSIS — M54.50 ACUTE LEFT-SIDED LOW BACK PAIN WITHOUT SCIATICA: Primary | ICD-10-CM

## 2025-02-28 PROCEDURE — 72100 X-RAY EXAM L-S SPINE 2/3 VWS: CPT

## 2025-02-28 RX ORDER — METHYLPREDNISOLONE 4 MG/1
TABLET ORAL
Qty: 21 TABLET | Refills: 0 | Status: SHIPPED | OUTPATIENT
Start: 2025-02-28 | End: 2025-03-06

## 2025-02-28 RX ORDER — KETOROLAC TROMETHAMINE 30 MG/ML
30 INJECTION, SOLUTION INTRAMUSCULAR; INTRAVENOUS ONCE
Status: COMPLETED | OUTPATIENT
Start: 2025-02-28 | End: 2025-02-28

## 2025-02-28 RX ORDER — KETOROLAC TROMETHAMINE 30 MG/ML
60 INJECTION, SOLUTION INTRAMUSCULAR; INTRAVENOUS ONCE
Status: CANCELLED | OUTPATIENT
Start: 2025-02-28 | End: 2025-02-28

## 2025-02-28 RX ADMIN — KETOROLAC TROMETHAMINE 30 MG: 30 INJECTION, SOLUTION INTRAMUSCULAR; INTRAVENOUS at 11:09

## 2025-02-28 SDOH — ECONOMIC STABILITY: FOOD INSECURITY: WITHIN THE PAST 12 MONTHS, YOU WORRIED THAT YOUR FOOD WOULD RUN OUT BEFORE YOU GOT MONEY TO BUY MORE.: NEVER TRUE

## 2025-02-28 SDOH — ECONOMIC STABILITY: FOOD INSECURITY: WITHIN THE PAST 12 MONTHS, THE FOOD YOU BOUGHT JUST DIDN'T LAST AND YOU DIDN'T HAVE MONEY TO GET MORE.: NEVER TRUE

## 2025-02-28 ASSESSMENT — PATIENT HEALTH QUESTIONNAIRE - PHQ9
1. LITTLE INTEREST OR PLEASURE IN DOING THINGS: NOT AT ALL
SUM OF ALL RESPONSES TO PHQ9 QUESTIONS 1 & 2: 0
SUM OF ALL RESPONSES TO PHQ QUESTIONS 1-9: 0
2. FEELING DOWN, DEPRESSED OR HOPELESS: NOT AT ALL
SUM OF ALL RESPONSES TO PHQ QUESTIONS 1-9: 0

## 2025-02-28 NOTE — ASSESSMENT & PLAN NOTE
Acute condition, new, no significant or neurological or bony abnormalities noted on physical exam. Likely from muscular strain but will rule out any bony abnormalities with xray lumbar spine. 1-time dose IM Toradol 30 mg given in the office. Start Medrol dose pack and PRN Zanaflex 4 mg TID for muscle spasm and pain.

## 2025-02-28 NOTE — PROGRESS NOTES
Administrations This Visit       ketorolac (TORADOL) injection 30 mg       Admin Date  02/28/2025  11:09 Action  Given Dose  30 mg Route  IntraMUSCular Site  Deltoid Right Documented By  Purvi Cope CMA    NDC: 38387-423-26    Lot#: [id number]    : [place]    Patient Supplied?: No    Comments: SITE:  RIGHT DELTOID  NDC#  20349-908-54  LOT#  [id number]  EXP DATE:  7/30/25  VERIFIED BY: MA

## 2025-02-28 NOTE — PROGRESS NOTES
Chief Complaint   Patient presents with    Lower Back Pain     PT C/O LOW BACK BACK PAIN THAT IS TO THE LEFT OF HIS SPINE THAT HAS BEEN GOING ON FOR ABOUT A WEEK NOW, PATIENT STATES THE PAIN COMES AND GOES AND ITS WORSE WHEN SITTING FOR LONG PERIODS OF TIME, PAIN DESCRIBED BEST AS A SORT OF STABBING PAIN, NO MEDS TRIED SITTING PROPPED UP IN HIS RECLINER DOES HELP         ASSESSMENT/PLAN    Problem List             Diagnosed       Other    Acute left-sided low back pain without sciatica - Primary       Acute condition, new, no significant or neurological or bony abnormalities noted on physical exam. Likely from muscular strain but will rule out any bony abnormalities with xray lumbar spine. 1-time dose IM Toradol 30 mg given in the office. Start Medrol dose pack and PRN Zanaflex 4 mg TID for muscle spasm and pain.         Relevant Medications    methylPREDNISolone (MEDROL, MARCUS,) 4 MG tablet    tiZANidine (ZANAFLEX) 4 MG tablet    Other Relevant Orders    XR LUMBAR SPINE (2-3 VIEWS)         Patient was recommended to follow up with annual well health exam.    Return if symptoms worsen or fail to improve.    Subjective   Rob Claire is a 74 y.o. male being seen in clinic for acute low back pain. Onset of symptom: 1 week ago. Symptoms include intermittent sharp, shooting pain. Denied urinary or bowel symptoms, weakness or numbness. Location left lower back. No history of injury or surgery. Has not tried anything over the counter for pain relief but helps pain by propping the recliner at a certain position. Normal kidney function as of 8/22/2024.      Review of Systems  Per HPI    Social History     Tobacco Use    Smoking status: Never    Smokeless tobacco: Never   Vaping Use    Vaping status: Never Used   Substance Use Topics    Alcohol use: Yes     Comment: little    Drug use: No       Patient Active Problem List   Diagnosis    Other symptoms involving cardiovascular system    Chalazion    Benign neoplasm of skin of

## 2025-03-10 DIAGNOSIS — E05.90 HYPERTHYROIDISM: ICD-10-CM

## 2025-03-11 LAB
T4 FREE SERPL-MCNC: 1.1 NG/DL (ref 0.9–1.8)
TSH SERPL DL<=0.005 MIU/L-ACNC: 0.03 UIU/ML (ref 0.27–4.2)

## 2025-03-12 ENCOUNTER — RESULTS FOLLOW-UP (OUTPATIENT)
Dept: ENDOCRINOLOGY | Age: 75
End: 2025-03-12

## 2025-03-27 ENCOUNTER — PROCEDURE VISIT (OUTPATIENT)
Dept: SPEECH THERAPY | Age: 75
End: 2025-03-27
Payer: MEDICARE

## 2025-03-27 DIAGNOSIS — R49.0 DYSPHONIA: Primary | ICD-10-CM

## 2025-03-27 DIAGNOSIS — R49.0 MUSCLE TENSION DYSPHONIA: ICD-10-CM

## 2025-03-27 DIAGNOSIS — J38.3 VOCAL FOLD ATROPHY: ICD-10-CM

## 2025-03-27 PROCEDURE — 92507 TX SP LANG VOICE COMM INDIV: CPT | Performed by: SPEECH-LANGUAGE PATHOLOGIST

## 2025-03-27 NOTE — PROGRESS NOTES
[facility] ENT  Voice Therapy      Pt Seen for Therapy - Session # 2 (2025)     Diagnosis/Indication:   Primary: Dysphonia  Secondary: TVF atrophy  Tertiary: MTD    Background History:   PMHx of dysphonia + dysphagia w/ initial onset ~4 months ago; unknown precipitating event. Dysphonia characterized as roughness/gravel; vocal quality typically best in AM but gradually worsens t/o day, specifically w/ increased vocal load. Denied pain/discomfort/fatigue. Unable to identify additional factors that may improve voice. Dysphagia characterized by sensation of bolus \"sticking\", specifically w/ medications or dry solids (ie bread); requires liquid wash to clear. Intermittent coughing w/ liquids, specifically when taking too large sip. 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 Denied dyspnea. Endorsed frequent throat clear d/t mucus; improved since starting Flonase and continues to use daily. Denied hx of GERD; taking Protonix 40 mg w/o symptomatic changes.     Previous SLP Evaluations:   VLS 8/29/24  VLS revealed L-sided supraglottic compression (osteophyte) resulting in impaired visualization of UES; questionable mildly restricted abduction of L arytenoid d/t location. Bilateral TVFs w/ mild atrophy; glottic closure characterized as spindle during all phonatory tasks. Mildly reduced superior-inferior wave but grossly functional periodicity. Mild-moderate supraglottic compression (LM>AP), more prominent during conversational tasks.     Repeat VLS 2/27/25  Post-IL VLS performed and demonstrated improved bowing of TVFs; mild bowing present on R but smooth & straight TVF on L. Glottic closure fluctuated between complete (clear phonation) vs trace spindle shape (diplophonia); ongoing mild-moderate compensatory supraglottic compression (LM>AP).    SUBJECTIVE:   Pt last seen ~one month ago; bilateral IL

## 2025-04-22 ENCOUNTER — PROCEDURE VISIT (OUTPATIENT)
Dept: SPEECH THERAPY | Age: 75
End: 2025-04-22
Payer: MEDICARE

## 2025-04-22 DIAGNOSIS — J38.3 VOCAL FOLD ATROPHY: ICD-10-CM

## 2025-04-22 DIAGNOSIS — R49.0 DYSPHONIA: Primary | ICD-10-CM

## 2025-04-22 DIAGNOSIS — R49.0 MUSCLE TENSION DYSPHONIA: ICD-10-CM

## 2025-04-22 PROCEDURE — 92507 TX SP LANG VOICE COMM INDIV: CPT | Performed by: SPEECH-LANGUAGE PATHOLOGIST

## 2025-04-22 NOTE — PROGRESS NOTES
[facility] ENT  Voice Therapy      Pt Seen for Therapy - Session # 3 (2025)     Diagnosis/Indication:   Primary: Dysphonia  Secondary: TVF atrophy  Tertiary: MTD    Background History:   PMHx of dysphonia + dysphagia w/ initial onset ~4 months ago; unknown precipitating event. Dysphonia characterized as roughness/gravel; vocal quality typically best in AM but gradually worsens t/o day, specifically w/ increased vocal load. Denied pain/discomfort/fatigue. Unable to identify additional factors that may improve voice. Dysphagia characterized by sensation of bolus \"sticking\", specifically w/ medications or dry solids (ie bread); requires liquid wash to clear. Intermittent coughing w/ liquids, specifically when taking too large sip. 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 Denied dyspnea. Endorsed frequent throat clear d/t mucus; improved since starting Flonase and continues to use daily. Denied hx of GERD; taking Protonix 40 mg w/o symptomatic changes.     Previous SLP Evaluations:   VLS 8/29/24  VLS revealed L-sided supraglottic compression (osteophyte) resulting in impaired visualization of UES; questionable mildly restricted abduction of L arytenoid d/t location. Bilateral TVFs w/ mild atrophy; glottic closure characterized as spindle during all phonatory tasks. Mildly reduced superior-inferior wave but grossly functional periodicity. Mild-moderate supraglottic compression (LM>AP), more prominent during conversational tasks.     Repeat VLS 2/27/25  Post-IL VLS performed and demonstrated improved bowing of TVFs; mild bowing present on R but smooth & straight TVF on L. Glottic closure fluctuated between complete (clear phonation) vs trace spindle shape (diplophonia); ongoing mild-moderate compensatory supraglottic compression (LM>AP).    SUBJECTIVE:   Bilateral IL performed ~10 wks ago; seen

## 2025-05-09 DIAGNOSIS — E05.90 HYPERTHYROIDISM: ICD-10-CM

## 2025-05-09 LAB
ALBUMIN SERPL-MCNC: 4.3 G/DL (ref 3.4–5)
ALBUMIN/GLOB SERPL: 1.9 {RATIO} (ref 1.1–2.2)
ALP SERPL-CCNC: 88 U/L (ref 40–129)
ALT SERPL-CCNC: 22 U/L (ref 10–40)
ANION GAP SERPL CALCULATED.3IONS-SCNC: 7 MMOL/L (ref 3–16)
AST SERPL-CCNC: 24 U/L (ref 15–37)
BILIRUB SERPL-MCNC: 0.6 MG/DL (ref 0–1)
BUN SERPL-MCNC: 22 MG/DL (ref 7–20)
CALCIUM SERPL-MCNC: 9.3 MG/DL (ref 8.3–10.6)
CHLORIDE SERPL-SCNC: 105 MMOL/L (ref 99–110)
CO2 SERPL-SCNC: 31 MMOL/L (ref 21–32)
CREAT SERPL-MCNC: 1.2 MG/DL (ref 0.8–1.3)
GFR SERPLBLD CREATININE-BSD FMLA CKD-EPI: 62 ML/MIN/{1.73_M2}
GLUCOSE SERPL-MCNC: 103 MG/DL (ref 70–99)
POTASSIUM SERPL-SCNC: 4.5 MMOL/L (ref 3.5–5.1)
PROT SERPL-MCNC: 6.6 G/DL (ref 6.4–8.2)
SODIUM SERPL-SCNC: 143 MMOL/L (ref 136–145)
T4 FREE SERPL-MCNC: 1 NG/DL (ref 0.9–1.8)
TSH SERPL DL<=0.005 MIU/L-ACNC: 0.12 UIU/ML (ref 0.27–4.2)

## 2025-05-13 ENCOUNTER — RESULTS FOLLOW-UP (OUTPATIENT)
Dept: ENDOCRINOLOGY | Age: 75
End: 2025-05-13

## 2025-05-15 ENCOUNTER — OFFICE VISIT (OUTPATIENT)
Dept: ENDOCRINOLOGY | Age: 75
End: 2025-05-15

## 2025-05-15 VITALS
HEIGHT: 71 IN | SYSTOLIC BLOOD PRESSURE: 130 MMHG | WEIGHT: 184 LBS | BODY MASS INDEX: 25.76 KG/M2 | DIASTOLIC BLOOD PRESSURE: 88 MMHG | HEART RATE: 73 BPM

## 2025-05-15 DIAGNOSIS — E05.00 GRAVES DISEASE: Primary | ICD-10-CM

## 2025-05-15 DIAGNOSIS — E05.90 HYPERTHYROIDISM: ICD-10-CM

## 2025-05-15 RX ORDER — METHIMAZOLE 5 MG/1
20 TABLET ORAL DAILY
Qty: 270 TABLET | Refills: 1 | Status: SHIPPED | OUTPATIENT
Start: 2025-05-15

## 2025-05-15 NOTE — PROGRESS NOTES
[facility] Endocrinology        Chief Complaint   Patient presents with    Hyperthyroidism    Graves' Disease       Rob Claire is a pleasant 74 y.o. year old male here for follow-up of hyperthyroidism due to Graves' disease.  Patient has a history of prostate cancer status post radiation in January 2023, kidney stones, arthritis and has a BMI of 25.    Patient had noticed unintentional weight loss around March 2024.  He also had noticed hoarseness in voice and dysphagia.  Further evaluation with PCP revealed suppressed TSH and elevated free T4 of 3.3.  TSI was elevated at 2.6.  ENT evaluation with soft tissue CT scan showed large anterior bridging osteophytes in the cervical spine, no abnormal soft tissue mass.  He was also referred for speech therapy given hoarseness of voice.  He was offered referral to neurosurgery for osteophyte removal though he prefers to hold off on that.    Given hyperthyroidism, he was started on methimazole 5 mg 3 times a day in June 2024.  He is currently on 15 mg once daily. He had noticed improvement on his symptoms and has been able to regain some weight.  He denies any palpitations or tremors.  No changes in sleep.  No vision changes.     He reports family history of hypothyroidism in daughter. No other autoimmune conditions in family. No biotin or lithium use.  Patient does not smoke and drinks rarely.  No recent drug use.  He was by himself and is .  He is retired from being an .    Lab Results   Component Value Date    TSH 0.12 (L) 05/09/2025    FT3 8.0 (H) 06/10/2024    T4FREE 1.0 05/09/2025       ALLERGIES:  No Known Allergies     MEDICATIONS:  Outpatient Medications Marked as Taking for the 5/15/25 encounter (Office Visit) with Marimar Anderson MD   Medication Sig Dispense Refill    methIMAzole (TAPAZOLE) 5 MG tablet Take 4 tablets by mouth daily 270 tablet 1    tiZANidine (ZANAFLEX) 4 MG tablet Take 1 tablet by mouth 3 times daily as needed (Low back

## 2025-06-04 ENCOUNTER — PATIENT MESSAGE (OUTPATIENT)
Dept: ENDOCRINOLOGY | Age: 75
End: 2025-06-04

## 2025-06-04 DIAGNOSIS — E05.90 HYPERTHYROIDISM: ICD-10-CM

## 2025-06-04 RX ORDER — METHIMAZOLE 5 MG/1
20 TABLET ORAL DAILY
Qty: 270 TABLET | Refills: 1 | Status: SHIPPED | OUTPATIENT
Start: 2025-06-04

## 2025-06-27 RX ORDER — PRAVASTATIN SODIUM 40 MG
TABLET ORAL
Qty: 90 TABLET | Refills: 3 | Status: SHIPPED | OUTPATIENT
Start: 2025-06-27

## 2025-07-14 DIAGNOSIS — E05.90 HYPERTHYROIDISM: ICD-10-CM

## 2025-07-14 LAB
T4 FREE SERPL-MCNC: 0.8 NG/DL (ref 0.9–1.8)
TSH SERPL DL<=0.005 MIU/L-ACNC: 1.26 UIU/ML (ref 0.27–4.2)

## (undated) DEVICE — SYRINGE MED 10ML SLIP TIP BLNT FILL AND LUERLOCK DISP

## (undated) DEVICE — SOLUTION IRRIG 1000ML STRL H2O USP PLAS POUR BTL

## (undated) DEVICE — Device

## (undated) DEVICE — SOLUTION IRRIGATION STRL H2O 1000 ML UROMATIC CONTAINER

## (undated) DEVICE — WET SKIN PREP TRAY: Brand: MEDLINE INDUSTRIES, INC.

## (undated) DEVICE — Y-TYPE TUR/BLADDER IRRIGATION SET, REGULATING CLAMP

## (undated) DEVICE — BLANKET WRM W29.9XL79.1IN UP BODY FORC AIR MISTRAL-AIR

## (undated) DEVICE — CYSTO PACK: Brand: MEDLINE INDUSTRIES, INC.

## (undated) DEVICE — CATHETER URETH 20FR BLLN 5CC F 2 W SPEC M OLV COUDE TIP

## (undated) DEVICE — GLOVE ORANGE PI 7   MSG9070

## (undated) DEVICE — Device: Brand: MEDEX

## (undated) DEVICE — BAG DRAINAGE NS

## (undated) DEVICE — BAG  LEG  EX-TUBING  18IN  MEDIUM  20OZ

## (undated) DEVICE — GUIDEWIRE ENDOSCP L150CM DIA0.035IN TIP 3CM PTFE NIT